# Patient Record
Sex: FEMALE | Race: WHITE | Employment: UNEMPLOYED | URBAN - METROPOLITAN AREA
[De-identification: names, ages, dates, MRNs, and addresses within clinical notes are randomized per-mention and may not be internally consistent; named-entity substitution may affect disease eponyms.]

---

## 2022-05-05 ENCOUNTER — HOSPITAL ENCOUNTER (INPATIENT)
Age: 82
LOS: 3 days | Discharge: HOME | DRG: 947 | End: 2022-05-08
Attending: EMERGENCY MEDICINE | Admitting: FAMILY MEDICINE
Payer: MEDICARE

## 2022-05-05 ENCOUNTER — APPOINTMENT (OUTPATIENT)
Dept: GENERAL RADIOLOGY | Age: 82
DRG: 947 | End: 2022-05-05
Payer: MEDICARE

## 2022-05-05 DIAGNOSIS — R79.89 ELEVATED BRAIN NATRIURETIC PEPTIDE (BNP) LEVEL: ICD-10-CM

## 2022-05-05 DIAGNOSIS — R60.9 PERIPHERAL EDEMA: ICD-10-CM

## 2022-05-05 DIAGNOSIS — R06.09 DOE (DYSPNEA ON EXERTION): Primary | ICD-10-CM

## 2022-05-05 DIAGNOSIS — R09.02 HYPOXIA: ICD-10-CM

## 2022-05-05 DIAGNOSIS — J44.1 CHRONIC OBSTRUCTIVE PULMONARY DISEASE WITH ACUTE EXACERBATION (HCC): ICD-10-CM

## 2022-05-05 LAB
A/G RATIO: 1.6 (ref 1.1–2.2)
ALBUMIN SERPL-MCNC: 3.9 G/DL (ref 3.4–5)
ALP BLD-CCNC: 111 U/L (ref 40–129)
ALT SERPL-CCNC: 23 U/L (ref 10–40)
ANION GAP SERPL CALCULATED.3IONS-SCNC: 10 MMOL/L (ref 3–16)
AST SERPL-CCNC: 29 U/L (ref 15–37)
BACTERIA: NORMAL /HPF
BASOPHILS ABSOLUTE: 0 K/UL (ref 0–0.2)
BASOPHILS RELATIVE PERCENT: 0.8 %
BILIRUB SERPL-MCNC: 0.9 MG/DL (ref 0–1)
BILIRUBIN URINE: NEGATIVE
BLOOD, URINE: ABNORMAL
BUN BLDV-MCNC: 22 MG/DL (ref 7–20)
C DIFF TOXIN/ANTIGEN: NORMAL
CALCIUM SERPL-MCNC: 9.4 MG/DL (ref 8.3–10.6)
CHLORIDE BLD-SCNC: 97 MMOL/L (ref 99–110)
CLARITY: CLEAR
CO2: 25 MMOL/L (ref 21–32)
COLOR: YELLOW
CREAT SERPL-MCNC: 0.7 MG/DL (ref 0.6–1.2)
EOSINOPHILS ABSOLUTE: 0.1 K/UL (ref 0–0.6)
EOSINOPHILS RELATIVE PERCENT: 1.4 %
EPITHELIAL CELLS, UA: 3 /HPF (ref 0–5)
GFR AFRICAN AMERICAN: >60
GFR NON-AFRICAN AMERICAN: >60
GLUCOSE BLD-MCNC: 124 MG/DL (ref 70–99)
GLUCOSE URINE: NEGATIVE MG/DL
HCT VFR BLD CALC: 38.3 % (ref 36–48)
HEMOGLOBIN: 12.8 G/DL (ref 12–16)
HYALINE CASTS: 0 /LPF (ref 0–8)
INFLUENZA A: NOT DETECTED
INFLUENZA B: NOT DETECTED
KETONES, URINE: NEGATIVE MG/DL
LEUKOCYTE ESTERASE, URINE: ABNORMAL
LIPASE: 23 U/L (ref 13–60)
LYMPHOCYTES ABSOLUTE: 1.2 K/UL (ref 1–5.1)
LYMPHOCYTES RELATIVE PERCENT: 23 %
MCH RBC QN AUTO: 32.8 PG (ref 26–34)
MCHC RBC AUTO-ENTMCNC: 33.5 G/DL (ref 31–36)
MCV RBC AUTO: 97.9 FL (ref 80–100)
MICROSCOPIC EXAMINATION: YES
MONOCYTES ABSOLUTE: 0.8 K/UL (ref 0–1.3)
MONOCYTES RELATIVE PERCENT: 15.1 %
NEUTROPHILS ABSOLUTE: 3.1 K/UL (ref 1.7–7.7)
NEUTROPHILS RELATIVE PERCENT: 59.7 %
NITRITE, URINE: NEGATIVE
PDW BLD-RTO: 13.2 % (ref 12.4–15.4)
PH UA: 5 (ref 5–8)
PLATELET # BLD: 135 K/UL (ref 135–450)
PMV BLD AUTO: 9.5 FL (ref 5–10.5)
POTASSIUM SERPL-SCNC: 3.9 MMOL/L (ref 3.5–5.1)
PRO-BNP: 1186 PG/ML (ref 0–449)
PROTEIN UA: NEGATIVE MG/DL
RBC # BLD: 3.91 M/UL (ref 4–5.2)
RBC UA: 1 /HPF (ref 0–4)
SARS-COV-2 RNA, RT PCR: NOT DETECTED
SODIUM BLD-SCNC: 132 MMOL/L (ref 136–145)
SPECIFIC GRAVITY UA: 1.01 (ref 1–1.03)
TOTAL PROTEIN: 6.4 G/DL (ref 6.4–8.2)
TROPONIN: <0.01 NG/ML
URINE REFLEX TO CULTURE: ABNORMAL
URINE TYPE: ABNORMAL
UROBILINOGEN, URINE: 0.2 E.U./DL
WBC # BLD: 5.2 K/UL (ref 4–11)
WBC UA: 2 /HPF (ref 0–5)

## 2022-05-05 PROCEDURE — 71045 X-RAY EXAM CHEST 1 VIEW: CPT

## 2022-05-05 PROCEDURE — 94760 N-INVAS EAR/PLS OXIMETRY 1: CPT

## 2022-05-05 PROCEDURE — 83880 ASSAY OF NATRIURETIC PEPTIDE: CPT

## 2022-05-05 PROCEDURE — 87636 SARSCOV2 & INF A&B AMP PRB: CPT

## 2022-05-05 PROCEDURE — 99285 EMERGENCY DEPT VISIT HI MDM: CPT

## 2022-05-05 PROCEDURE — 2700000000 HC OXYGEN THERAPY PER DAY

## 2022-05-05 PROCEDURE — 93005 ELECTROCARDIOGRAM TRACING: CPT | Performed by: EMERGENCY MEDICINE

## 2022-05-05 PROCEDURE — 2580000003 HC RX 258: Performed by: FAMILY MEDICINE

## 2022-05-05 PROCEDURE — 80053 COMPREHEN METABOLIC PANEL: CPT

## 2022-05-05 PROCEDURE — 85025 COMPLETE CBC W/AUTO DIFF WBC: CPT

## 2022-05-05 PROCEDURE — 83690 ASSAY OF LIPASE: CPT

## 2022-05-05 PROCEDURE — 1200000000 HC SEMI PRIVATE

## 2022-05-05 PROCEDURE — 6360000002 HC RX W HCPCS: Performed by: PHYSICIAN ASSISTANT

## 2022-05-05 PROCEDURE — 87449 NOS EACH ORGANISM AG IA: CPT

## 2022-05-05 PROCEDURE — 36415 COLL VENOUS BLD VENIPUNCTURE: CPT

## 2022-05-05 PROCEDURE — 6360000002 HC RX W HCPCS: Performed by: FAMILY MEDICINE

## 2022-05-05 PROCEDURE — 81001 URINALYSIS AUTO W/SCOPE: CPT

## 2022-05-05 PROCEDURE — 87324 CLOSTRIDIUM AG IA: CPT

## 2022-05-05 PROCEDURE — 84484 ASSAY OF TROPONIN QUANT: CPT

## 2022-05-05 PROCEDURE — 6370000000 HC RX 637 (ALT 250 FOR IP): Performed by: PHYSICIAN ASSISTANT

## 2022-05-05 RX ORDER — ZINC GLUCONATE 50 MG
50 TABLET ORAL DAILY
COMMUNITY

## 2022-05-05 RX ORDER — HYDROCHLOROTHIAZIDE 25 MG/1
25 TABLET ORAL DAILY
Status: ON HOLD | COMMUNITY
End: 2022-05-06 | Stop reason: HOSPADM

## 2022-05-05 RX ORDER — CHLORAL HYDRATE 500 MG
3000 CAPSULE ORAL DAILY
COMMUNITY

## 2022-05-05 RX ORDER — ENOXAPARIN SODIUM 100 MG/ML
40 INJECTION SUBCUTANEOUS NIGHTLY
Status: DISCONTINUED | OUTPATIENT
Start: 2022-05-05 | End: 2022-05-08 | Stop reason: HOSPADM

## 2022-05-05 RX ORDER — ONDANSETRON 2 MG/ML
4 INJECTION INTRAMUSCULAR; INTRAVENOUS EVERY 6 HOURS PRN
Status: DISCONTINUED | OUTPATIENT
Start: 2022-05-05 | End: 2022-05-08 | Stop reason: HOSPADM

## 2022-05-05 RX ORDER — SODIUM CHLORIDE 0.9 % (FLUSH) 0.9 %
5-40 SYRINGE (ML) INJECTION EVERY 12 HOURS SCHEDULED
Status: DISCONTINUED | OUTPATIENT
Start: 2022-05-05 | End: 2022-05-08 | Stop reason: HOSPADM

## 2022-05-05 RX ORDER — UBIDECARENONE 75 MG
200 CAPSULE ORAL
COMMUNITY

## 2022-05-05 RX ORDER — FUROSEMIDE 10 MG/ML
40 INJECTION INTRAMUSCULAR; INTRAVENOUS ONCE
Status: COMPLETED | OUTPATIENT
Start: 2022-05-05 | End: 2022-05-05

## 2022-05-05 RX ORDER — ISOSORBIDE MONONITRATE 30 MG/1
30 TABLET, EXTENDED RELEASE ORAL DAILY
COMMUNITY

## 2022-05-05 RX ORDER — ISOSORBIDE MONONITRATE 30 MG/1
30 TABLET, EXTENDED RELEASE ORAL DAILY
Status: DISCONTINUED | OUTPATIENT
Start: 2022-05-06 | End: 2022-05-08 | Stop reason: HOSPADM

## 2022-05-05 RX ORDER — SODIUM CHLORIDE 0.9 % (FLUSH) 0.9 %
5-40 SYRINGE (ML) INJECTION PRN
Status: DISCONTINUED | OUTPATIENT
Start: 2022-05-05 | End: 2022-05-08 | Stop reason: HOSPADM

## 2022-05-05 RX ORDER — METOPROLOL TARTRATE 50 MG/1
50 TABLET, FILM COATED ORAL DAILY
COMMUNITY

## 2022-05-05 RX ORDER — POLYETHYLENE GLYCOL 3350 17 G/17G
17 POWDER, FOR SOLUTION ORAL DAILY PRN
Status: DISCONTINUED | OUTPATIENT
Start: 2022-05-05 | End: 2022-05-08 | Stop reason: HOSPADM

## 2022-05-05 RX ORDER — ASPIRIN 81 MG/1
81 TABLET ORAL DAILY
COMMUNITY

## 2022-05-05 RX ORDER — ACETAMINOPHEN 650 MG/1
650 SUPPOSITORY RECTAL EVERY 6 HOURS PRN
Status: DISCONTINUED | OUTPATIENT
Start: 2022-05-05 | End: 2022-05-08 | Stop reason: HOSPADM

## 2022-05-05 RX ORDER — SODIUM CHLORIDE 9 MG/ML
INJECTION, SOLUTION INTRAVENOUS PRN
Status: DISCONTINUED | OUTPATIENT
Start: 2022-05-05 | End: 2022-05-08 | Stop reason: HOSPADM

## 2022-05-05 RX ORDER — LISINOPRIL 10 MG/1
10 TABLET ORAL DAILY
COMMUNITY

## 2022-05-05 RX ORDER — ASPIRIN 81 MG/1
243 TABLET, CHEWABLE ORAL ONCE
Status: COMPLETED | OUTPATIENT
Start: 2022-05-05 | End: 2022-05-05

## 2022-05-05 RX ORDER — ACETAMINOPHEN 160 MG
TABLET,DISINTEGRATING ORAL
COMMUNITY

## 2022-05-05 RX ORDER — ACETAMINOPHEN 325 MG/1
650 TABLET ORAL EVERY 6 HOURS PRN
Status: DISCONTINUED | OUTPATIENT
Start: 2022-05-05 | End: 2022-05-08 | Stop reason: HOSPADM

## 2022-05-05 RX ORDER — FUROSEMIDE 10 MG/ML
20 INJECTION INTRAMUSCULAR; INTRAVENOUS DAILY
Status: DISCONTINUED | OUTPATIENT
Start: 2022-05-06 | End: 2022-05-07

## 2022-05-05 RX ORDER — LISINOPRIL 10 MG/1
10 TABLET ORAL DAILY
Status: DISCONTINUED | OUTPATIENT
Start: 2022-05-06 | End: 2022-05-08 | Stop reason: HOSPADM

## 2022-05-05 RX ORDER — ONDANSETRON 4 MG/1
4 TABLET, ORALLY DISINTEGRATING ORAL EVERY 8 HOURS PRN
Status: DISCONTINUED | OUTPATIENT
Start: 2022-05-05 | End: 2022-05-08 | Stop reason: HOSPADM

## 2022-05-05 RX ORDER — ASPIRIN 81 MG/1
81 TABLET ORAL DAILY
Status: DISCONTINUED | OUTPATIENT
Start: 2022-05-06 | End: 2022-05-08 | Stop reason: HOSPADM

## 2022-05-05 RX ADMIN — FUROSEMIDE 40 MG: 10 INJECTION, SOLUTION INTRAMUSCULAR; INTRAVENOUS at 16:10

## 2022-05-05 RX ADMIN — ASPIRIN 81 MG 243 MG: 81 TABLET ORAL at 15:47

## 2022-05-05 RX ADMIN — Medication 10 ML: at 20:59

## 2022-05-05 RX ADMIN — ENOXAPARIN SODIUM 40 MG: 100 INJECTION SUBCUTANEOUS at 20:59

## 2022-05-05 ASSESSMENT — ENCOUNTER SYMPTOMS
NAUSEA: 1
BACK PAIN: 0
COLOR CHANGE: 0
VOMITING: 0
STRIDOR: 0
SHORTNESS OF BREATH: 1
ABDOMINAL PAIN: 0
DIARRHEA: 1
WHEEZING: 0
COUGH: 1
CONSTIPATION: 0

## 2022-05-05 ASSESSMENT — PAIN - FUNCTIONAL ASSESSMENT: PAIN_FUNCTIONAL_ASSESSMENT: NONE - DENIES PAIN

## 2022-05-05 NOTE — ED PROVIDER NOTES
EKG is reviewed by myself. Dated today 46. Rate 72 sinus rhythm normal EKG.      Naga Vasquez MD  05/05/22 0007

## 2022-05-05 NOTE — ED PROVIDER NOTES
[facility]        Pt Name: Fouzia Truong  MRN: [id number]  [place] 1940  Date of evaluation: 5/5/2022  Provider: Joby Murray PA-C  PCP: Rosie Martinez  Note Started: 1:46 PM EDT        I have seen and evaluated this patient with my supervising physician Letty Marr MD.    [facility]       Chief Complaint   Patient presents with    Cough     non productive cough with sob during ambulation since sunday, negative covid test       HISTORY OF PRESENT ILLNESS   (Location, Timing/Onset, Context/Setting, Quality, Duration, Modifying Factors, Severity, Associated Signs and Symptoms)  Note limiting factors. Chief Complaint: Cough, shortness of breath, fatigue, diarrhea    Fouzia Truong is a 80 y.o. female who presents to the emergency department with daughter at bedside. Patient reports a nonproductive cough since Sunday. She has progressively become more short of breath, primarily with mild exertion. Denies chest pain, hemoptysis, palpitations, abdominal pain, vomiting, bloody or black stool. she developed nausea and diarrhea today. She describes it as watery stool. Denies documented fever or chills. Daughter is concerned because patient has history of coronary artery disease and stents. Rapid covid test today at home was negative. She does report some mild edema in lower legs which is new. Nursing Notes were all reviewed and agreed with or any disagreements were addressed in the HPI. REVIEW OF SYSTEMS    (2-9 systems for level 4, 10 or more for level 5)     Review of Systems   Constitutional: Positive for fatigue. Negative for chills and fever. HENT: Negative. Eyes: Negative for visual disturbance. Respiratory: Positive for cough and shortness of breath. Negative for wheezing and stridor. Cardiovascular: Positive for leg swelling. Negative for chest pain and palpitations.    Gastrointestinal: Positive for diarrhea and nausea. Negative for abdominal pain, constipation and vomiting. Genitourinary: Negative. Musculoskeletal: Negative for back pain, neck pain and neck stiffness. Skin: Negative for color change, pallor, rash and wound. Neurological: Negative for dizziness, tremors, seizures, syncope, facial asymmetry, speech difficulty, weakness, light-headedness, numbness and headaches. Psychiatric/Behavioral: Negative for confusion. All other systems reviewed and are negative. Positives and Pertinent negatives as per HPI. Except as noted above in the ROS, all other systems were reviewed and negative. PAST MEDICAL HISTORY     Past Medical History:   Diagnosis Date    CAD (coronary artery disease)     Hyperlipidemia     Hypertension          SURGICAL HISTORY     Past Surgical History:   Procedure Laterality Date    CARDIAC SURGERY      CHOLECYSTECTOMY           CURRENTMEDICATIONS       Previous Medications    ASPIRIN 81 MG EC TABLET    Take 81 mg by mouth daily    CHOLECALCIFEROL (VITAMIN D3) 50 MCG (2000 UT) CAPS    Take by mouth    COENZYME Q10 (CO Q-10) 200 MG CAPS    Take 200 mg by mouth    CRANBERRY EXTRACT PO    Take by mouth    HYDROCHLOROTHIAZIDE (HYDRODIURIL) 25 MG TABLET    Take 25 mg by mouth daily    ISOSORBIDE MONONITRATE (IMDUR) 30 MG EXTENDED RELEASE TABLET    Take 30 mg by mouth daily    LISINOPRIL (PRINIVIL;ZESTRIL) 10 MG TABLET    Take 10 mg by mouth daily    METOPROLOL TARTRATE (LOPRESSOR) 50 MG TABLET    Take 50 mg by mouth daily 1 1/2 tabs    OMEGA-3 FATTY ACIDS (FISH OIL) 1000 MG CAPS    Take 3,000 mg by mouth daily    ZINC 50 MG TABS TABLET    Take 50 mg by mouth daily         ALLERGIES     Patient has no allergy information on record. FAMILYHISTORY     No family history on file.        SOCIAL HISTORY       Social History     Tobacco Use    Smoking status: Former Smoker    Smokeless tobacco: Never Used   Vaping Use    Vaping Use: Never used   Substance Use Topics    Alcohol use: Never    Drug use: Never       SCREENINGS    [place] Coma Scale  Eye Opening: Spontaneous  Best Verbal Response: Oriented  Best Motor Response: Obeys commands  [place]rist[place]bal Coma Scale Score: 15        PHYSICAL EXAM    (up to 7 for level 4, 8 or more for level 5)     ED Triage Vitals [05/05/22 1319]   BP Temp Temp Source Pulse Resp SpO2 Height Weight   (!) 151/68 97.7 °F (36.5 °C) Oral 76 20 94 % 4' 9\" (1.448 m) 155 lb (70.3 kg)       Physical Exam  Vitals and nursing note reviewed. Constitutional:       Appearance: Normal appearance. She is well-developed. She is not toxic-appearing or diaphoretic. HENT:      Head: Normocephalic and atraumatic. Jaw: There is normal jaw occlusion. Right Ear: External ear normal.      Left Ear: External ear normal.      Nose: Nose normal.      Mouth/Throat:      Lips: Pink. Mouth: Mucous membranes are moist. No oral lesions or angioedema. Dentition: No dental tenderness or dental abscesses. Pharynx: Oropharynx is clear. Uvula midline. No uvula swelling. Eyes:      General: No scleral icterus. Right eye: No discharge. Left eye: No discharge. Extraocular Movements: Extraocular movements intact. Conjunctiva/sclera: Conjunctivae normal.      Pupils: Pupils are equal, round, and reactive to light. Cardiovascular:      Rate and Rhythm: Normal rate. Pulmonary:      Effort: Pulmonary effort is normal.      Breath sounds: No stridor. No rhonchi. Abdominal:      General: Bowel sounds are normal. There is no distension. Palpations: Abdomen is soft. Tenderness: There is no abdominal tenderness. There is no right CVA tenderness or left CVA tenderness. Musculoskeletal:         General: Normal range of motion. Cervical back: Normal range of motion. Comments: No posterior calf or thigh tenderness, palpable cord, discoloration. Negative homans. Trace symmetrical pretibial edema.    Skin: General: Skin is warm and dry. Coloration: Skin is not jaundiced or pale. Findings: No bruising, erythema, lesion or rash. Neurological:      General: No focal deficit present. Mental Status: She is alert and oriented to person, place, and time. Psychiatric:         Behavior: Behavior normal.         DIAGNOSTIC RESULTS   LABS:    Labs Reviewed   CBC WITH AUTO DIFFERENTIAL - Abnormal; Notable for the following components:       Result Value    RBC 3.91 (*)     All other components within normal limits   COMPREHENSIVE METABOLIC PANEL - Abnormal; Notable for the following components:    Sodium 132 (*)     Chloride 97 (*)     Glucose 124 (*)     BUN 22 (*)     All other components within normal limits   BRAIN NATRIURETIC PEPTIDE - Abnormal; Notable for the following components:    Pro-BNP 1,186 (*)     All other components within normal limits   URINALYSIS WITH REFLEX TO CULTURE - Abnormal; Notable for the following components:    Blood, Urine TRACE (*)     Leukocyte Esterase, Urine TRACE (*)     All other components within normal limits   COVID-19 & INFLUENZA COMBO   GASTROINTESTINAL PANEL, MOLECULAR   C DIFF TOXIN/ANTIGEN   TROPONIN   LIPASE   MICROSCOPIC URINALYSIS       When ordered only abnormal lab results are displayed. All other labs were within normal range or not returned as of this dictation. EKG: When ordered, EKG's are interpreted by the Emergency Department Physician in the absence of a cardiologist.  Please see their note for interpretation of EKG. RADIOLOGY:   Non-plain film images such as CT, Ultrasound and MRI are read by the radiologist. Plain radiographic images are visualized and preliminarily interpreted by the ED Provider with the below findings:        Interpretation per the Radiologist below, if available at the time of this note:    XR CHEST PORTABLE   Final Result   No acute process.                PROCEDURES   Unless otherwise noted below, none Procedures    CRITICAL CARE TIME   Critical Care  There was a high probability of life-threatening clinical deterioration in the patient's condition requiring my urgent intervention. Total critical care time with the patient was 31 minutes excluding separately reportable procedures. Critical care required due to patients hypoxia with ambulation and clinical concern for developing chf prompting medical management, consultation and admission. CONSULTS:  IP CONSULT TO HOSPITALIST      EMERGENCY DEPARTMENT COURSE and DIFFERENTIAL DIAGNOSIS/MDM:   Vitals:    Vitals:    05/05/22 1319 05/05/22 1434   BP: (!) 151/68 (!) 144/55   Pulse: 76 69   Resp: 20 24   Temp: 97.7 °F (36.5 °C)    TempSrc: Oral    SpO2: 94% 94%   Weight: 155 lb (70.3 kg)    Height: 4' 9\" (1.448 m)        Patient was given the following medications:  Medications   furosemide (LASIX) injection 40 mg (has no administration in time range)   aspirin chewable tablet 243 mg (has no administration in time range)         This patient presents to the emergency department with complaints of shortness of breath and nonproductive cough. She also reports some peripheral edema. Abdomen soft and nontender but does associate this with nausea and diarrhea starting today. She has no emesis throughout stay here. Has not been able to give a stool sample yet. Chest x-ray appears stable however BNP is elevated. COVID and flu swabs are negative. My attending ambulated this patient on room air and oxygen saturation dropped to 87%. Therefore, we do feel admission is warranted. We are concerned for developing CHF. Patient and family understand and agree with plan. FINAL IMPRESSION      1. MENENDEZ (dyspnea on exertion)    2. Peripheral edema    3. Hypoxia    4. Elevated brain natriuretic peptide (BNP) level          DISPOSITION/PLAN   DISPOSITION        PATIENT REFERRED TO:  No follow-up provider specified.     DISCHARGE MEDICATIONS:  New Prescriptions    No medications on file       DISCONTINUED MEDICATIONS:  Discontinued Medications    No medications on file              (Please note that portions of this note were completed with a voice recognition program.  Efforts were made to edit the dictations but occasionally words are mis-transcribed.)    Neliad Coleman PA-C (electronically signed)           Nelida Coleman PA-C  05/05/22 1529

## 2022-05-05 NOTE — PROGRESS NOTES
Pt admitted to [address] [place] [address] from ED. A&Ox4. VSS. Denies pain. SOB with exertion. On 2 L NC, not home dependent. Rule out c diff isolation, sample collected in ED. Oriented to room and procedures. Educated on use of call light for needs, verbalized understanding. Bedside table and call light within reach. Bed alarm on. Will continue to monitor.     Electronically signed by Jay Nelson RN on 5/5/2022 at 6:40 PM

## 2022-05-05 NOTE — ED PROVIDER NOTES
I independently performed a history and physical on Alex Dewitt. I personally saw the patient and performed a substantive portion of the visit including all aspects of the medical decision making. Briefly, this is a 80 y.o. female here for shortness of breath and cough. Started on Sunday. States today started having diarrhea. Some nausea without vomiting. Cough is productive. Had sick contacts from her grandchildren. Not having body aches. He is having difficulty ambulating. Lives with her  at home. Is visiting her daughter. No history of CHF. Has occasional chest pain on and off but this is chronic and unchanged. .    On exam,   General: Patient is in no acute distress  Skin: No cyanosis  HEENT: Moist mucous membranes  Heart: Regular rate, regular rhythm  Lung: No respiratory distress, mild rhonchi bilaterally. Coughing  Abdomen: Soft, nontender  Neuro: Moving all extremities, no facial droop, no slurred speech, answers questions appropriately        EKG  The Ekg interpreted by me in the absence of a cardiologist shows. Normal sinus rhythm  No acute ST changes   HR 72  Nonspecific T wave abnormality  No priors to compare      Screenings   [place] Coma Scale  Eye Opening: Spontaneous  Best Verbal Response: Oriented  Best Motor Response: Obeys commands  Cristóbal Coma Scale Score: 15        MDM  Briefly, this is a 80 y.o. female here for cough, shortness of breath. Has unchanged baseline occasional angina. Likely infectious in etiology. Chest x-ray shows no obvious pneumonia. No signs of sepsis. I did ambulate the patient and she became significantly dyspneic. She desaturated to 87% and she typically does not use oxygen. Will admit to hospitalist service. Likely has some aspect of failure given BNP is elevated. We will give some Lasix and aspirin. Doubt ACS. Initial troponin negative. EKG shows no acute ischemic changes.   Given predominant infectious symptoms, I doubt that this is pulmonary embolism. .         Patient Referrals:  No follow-up provider specified. Discharge Medications:  New Prescriptions    No medications on file       FINAL IMPRESSION  1. MENENDEZ (dyspnea on exertion)    2. Peripheral edema    3. Hypoxia        Blood pressure (!) 144/55, pulse 69, temperature 97.7 °F (36.5 °C), temperature source Oral, resp. rate 24, height 4' 9\" (1.448 m), weight 155 lb (70.3 kg), SpO2 94 %. For further details of Angela Denise emergency department encounter, please see documentation by advanced practice providerCandy.         Erick Damon MD  05/05/22 6754

## 2022-05-06 LAB
ANION GAP SERPL CALCULATED.3IONS-SCNC: 12 MMOL/L (ref 3–16)
BUN BLDV-MCNC: 24 MG/DL (ref 7–20)
CALCIUM SERPL-MCNC: 9.4 MG/DL (ref 8.3–10.6)
CHLORIDE BLD-SCNC: 99 MMOL/L (ref 99–110)
CO2: 26 MMOL/L (ref 21–32)
CREAT SERPL-MCNC: 0.7 MG/DL (ref 0.6–1.2)
EKG ATRIAL RATE: 72 BPM
EKG DIAGNOSIS: NORMAL
EKG P AXIS: 37 DEGREES
EKG P-R INTERVAL: 170 MS
EKG Q-T INTERVAL: 388 MS
EKG QRS DURATION: 92 MS
EKG QTC CALCULATION (BAZETT): 424 MS
EKG R AXIS: 20 DEGREES
EKG T AXIS: 41 DEGREES
EKG VENTRICULAR RATE: 72 BPM
GFR AFRICAN AMERICAN: >60
GFR NON-AFRICAN AMERICAN: >60
GLUCOSE BLD-MCNC: 117 MG/DL (ref 70–99)
HCT VFR BLD CALC: 38 % (ref 36–48)
HEMOGLOBIN: 12.7 G/DL (ref 12–16)
LV EF: 68 %
LVEF MODALITY: NORMAL
MCH RBC QN AUTO: 32.9 PG (ref 26–34)
MCHC RBC AUTO-ENTMCNC: 33.5 G/DL (ref 31–36)
MCV RBC AUTO: 98.4 FL (ref 80–100)
PDW BLD-RTO: 13.1 % (ref 12.4–15.4)
PLATELET # BLD: 133 K/UL (ref 135–450)
PMV BLD AUTO: 9.7 FL (ref 5–10.5)
POTASSIUM SERPL-SCNC: 3.9 MMOL/L (ref 3.5–5.1)
RBC # BLD: 3.87 M/UL (ref 4–5.2)
SODIUM BLD-SCNC: 137 MMOL/L (ref 136–145)
WBC # BLD: 4.9 K/UL (ref 4–11)

## 2022-05-06 PROCEDURE — 2580000003 HC RX 258: Performed by: FAMILY MEDICINE

## 2022-05-06 PROCEDURE — 97165 OT EVAL LOW COMPLEX 30 MIN: CPT

## 2022-05-06 PROCEDURE — 97161 PT EVAL LOW COMPLEX 20 MIN: CPT

## 2022-05-06 PROCEDURE — 36415 COLL VENOUS BLD VENIPUNCTURE: CPT

## 2022-05-06 PROCEDURE — 6370000000 HC RX 637 (ALT 250 FOR IP): Performed by: FAMILY MEDICINE

## 2022-05-06 PROCEDURE — 97530 THERAPEUTIC ACTIVITIES: CPT

## 2022-05-06 PROCEDURE — 93010 ELECTROCARDIOGRAM REPORT: CPT | Performed by: INTERNAL MEDICINE

## 2022-05-06 PROCEDURE — 80048 BASIC METABOLIC PNL TOTAL CA: CPT

## 2022-05-06 PROCEDURE — 1200000000 HC SEMI PRIVATE

## 2022-05-06 PROCEDURE — 6360000002 HC RX W HCPCS: Performed by: FAMILY MEDICINE

## 2022-05-06 PROCEDURE — 94680 O2 UPTK RST&XERS DIR SIMPLE: CPT

## 2022-05-06 PROCEDURE — 97535 SELF CARE MNGMENT TRAINING: CPT

## 2022-05-06 PROCEDURE — 93306 TTE W/DOPPLER COMPLETE: CPT

## 2022-05-06 PROCEDURE — 85027 COMPLETE CBC AUTOMATED: CPT

## 2022-05-06 RX ORDER — FUROSEMIDE 20 MG/1
20 TABLET ORAL DAILY
Qty: 30 TABLET | Refills: 0 | Status: SHIPPED
Start: 2022-05-06 | End: 2022-05-08 | Stop reason: HOSPADM

## 2022-05-06 RX ORDER — ALBUTEROL SULFATE 90 UG/1
2 AEROSOL, METERED RESPIRATORY (INHALATION) EVERY 6 HOURS PRN
Status: DISCONTINUED | OUTPATIENT
Start: 2022-05-06 | End: 2022-05-08 | Stop reason: HOSPADM

## 2022-05-06 RX ADMIN — METOPROLOL SUCCINATE 75 MG: 50 TABLET, EXTENDED RELEASE ORAL at 08:26

## 2022-05-06 RX ADMIN — Medication 10 ML: at 08:26

## 2022-05-06 RX ADMIN — ASPIRIN 81 MG: 81 TABLET, COATED ORAL at 08:26

## 2022-05-06 RX ADMIN — Medication 10 ML: at 20:01

## 2022-05-06 RX ADMIN — LISINOPRIL 10 MG: 10 TABLET ORAL at 08:26

## 2022-05-06 RX ADMIN — FUROSEMIDE 20 MG: 10 INJECTION, SOLUTION INTRAMUSCULAR; INTRAVENOUS at 08:26

## 2022-05-06 RX ADMIN — ISOSORBIDE MONONITRATE 30 MG: 30 TABLET, EXTENDED RELEASE ORAL at 08:26

## 2022-05-06 RX ADMIN — ENOXAPARIN SODIUM 40 MG: 100 INJECTION SUBCUTANEOUS at 20:00

## 2022-05-06 ASSESSMENT — PAIN SCALES - GENERAL
PAINLEVEL_OUTOF10: 0

## 2022-05-06 NOTE — PROGRESS NOTES
05/06/22 1734   Resting (Room Air)   SpO2 87   HR 72   Resting (On O2)   SpO2 92   HR 66   O2 Device Nasal cannula   O2 Flow Rate (l/min) 1 l/min   During Walk (Room Air)   Walk/Assistance Device Walker   During Walk (On O2)   SpO2 90   HR 75   O2 Device Nasal cannula   O2 Flow Rate (l/min) 2 l/min   Need Additional O2 Flow Rate Rows Yes   O2 Flow Rate (l/min) 3 l/min   O2 Saturation 92   Rate of Dyspnea 0   After Walk   Does the Patient Qualify for Home O2 Yes Clear bilaterally, pupils equal, round and reactive to light.

## 2022-05-06 NOTE — FLOWSHEET NOTE
05/06/22 1507   Encounter Summary   Encounter Overview/Reason  Initial Encounter; Advance Care Planning   Service Provided For: Patient and family together   Referral/Consult From: Nurse   Support System Spouse; Children;Family members   Last Encounter  05/06/22  (AD discussion, no needs expressed. See ACP note. GB 5/6)   Complexity of Encounter Low   Begin Time 1445   End Time  1510   Total Time Calculated 25 min   Encounter    Type Initial Screen/Assessment   Advance Care Planning   Type ACP conversation     See ACP note below. Electronically signed by Arnoldo Walter on 5/6/2022 at 3:08 PM    Advance Care Planning     Advance Care Planning Inpatient Note  Spiritual Care Department    Today's Date: 5/6/2022  [facility]    Received request from IDT Member. Upon review of chart and communication with care team, patient's decision making abilities are not in question. . Patient and family was/were present in the room during visit. Goals of ACP Conversation:  Discuss advance care planning documents    Assessment:   self-initiated this visit per spiritual care consult. Pt and family were present at this time and engaged easily w/ at this time. Pt and family indicated that they have no AD needs at this time. Pt identified that she has previously completed AD's and has a copy they would be able to have a family member to bring to the hospital.  encouraged family to bring a copy to the hospital at this time. No other immediate AD or spiritual care needs were expressed at this time. Should any further needs arise, please contact spiritual care services.      Interventions:  Requested patient/family to submit existing document for our records: Healthcare Power of /Advance Directive Appointment of [address]  Encouraged ongoing ACP conversation with future decision makers and loved ones    Care Preferences Communicated:   No    Outcomes/Plan:  ACP Discussion: Completed    Electronically signed by Unknown Kehr on 5/6/2022 at 3:08 PM

## 2022-05-06 NOTE — PROGRESS NOTES
[facility] PROGRESS NOTE    5/6/2022 2:38 PM        Name: Jaron Thrasher . Admitted: 5/5/2022  Primary Care Provider: Raven Jessica (Tel: None)                        Subjective:  . No acute events overnight. Resting well. Pain control. Diet ok. Labs reviewed  Denies any chest pain sob. Reviewed interval ancillary notes    Current Medications  aspirin EC tablet 81 mg, Daily  isosorbide mononitrate (IMDUR) extended release tablet 30 mg, Daily  lisinopril (PRINIVIL;ZESTRIL) tablet 10 mg, Daily  furosemide (LASIX) injection 20 mg, Daily  sodium chloride flush 0.9 % injection 5-40 mL, 2 times per day  sodium chloride flush 0.9 % injection 5-40 mL, PRN  0.9 % sodium chloride infusion, PRN  enoxaparin (LOVENOX) injection 40 mg, Nightly  ondansetron (ZOFRAN-ODT) disintegrating tablet 4 mg, Q8H PRN   Or  ondansetron (ZOFRAN) injection 4 mg, Q6H PRN  polyethylene glycol (GLYCOLAX) packet 17 g, Daily PRN  acetaminophen (TYLENOL) tablet 650 mg, Q6H PRN   Or  acetaminophen (TYLENOL) suppository 650 mg, Q6H PRN  metoprolol succinate (TOPROL XL) extended release tablet 75 mg, Daily        Objective:  BP (!) 94/54   Pulse 66   Temp 97.5 °F (36.4 °C) (Oral)   Resp 19   Ht 4' 9\" (1.448 m)   Wt 155 lb (70.3 kg)   SpO2 93%   BMI 33.54 kg/m²     Intake/Output Summary (Last 24 hours) at 5/6/2022 1438  Last data filed at 5/6/2022 1055  Gross per 24 hour   Intake 350 ml   Output 200 ml   Net 150 ml      Wt Readings from Last 3 Encounters:   05/05/22 155 lb (70.3 kg)       General appearance:  Appears comfortable  Eyes: Sclera clear. Pupils equal.  ENT: Moist oral mucosa. Trachea midline, no adenopathy. Cardiovascular: Regular rhythm, normal S1, S2. No murmur. No edema in lower extremities  Respiratory: Not using accessory muscles. Good inspiratory effort.  Clear to auscultation bilaterally, no wheeze or crackles. GI: Abdomen soft, no tenderness, not distended, normal bowel sounds  Musculoskeletal: No cyanosis in digits, neck supple  Neurology: CN 2-12 grossly intact. No speech or motor deficits  Psych: Normal affect. Alert and oriented in time, place and person  Skin: Warm, dry, normal turgor    Labs and Tests:  CBC:   Recent Labs     05/05/22  1350 05/06/22  0528   WBC 5.2 4.9   HGB 12.8 12.7    133*     BMP:    Recent Labs     05/05/22  1350 05/06/22  0528   * 137   K 3.9 3.9   CL 97* 99   CO2 25 26   BUN 22* 24*   CREATININE 0.7 0.7   GLUCOSE 124* 117*     Hepatic:   Recent Labs     05/05/22  1350   AST 29   ALT 23   BILITOT 0.9   ALKPHOS 111       Discussed care with patient             Problem List  Principal Problem:    Peripheral edema  Resolved Problems:    * No resolved hospital problems. *       Assessment & Plan:   1. Peripheral edema  -Seems to be improving.  -Echocardiogram does not show any signs of any systolic dysfunction  -Could be gravity dependent. We will continue IV Lasix for 1 more day    Dyspnea and ambulatory hypoxia.  -On room air right now. Monitor    PT OT evaluation if continues to improve plan for discharge tomorrow      Diet: ADULT DIET;  Regular  Code:Full Code  DVT PPX lovenox       Kika Be MD   5/6/2022 2:38 PM

## 2022-05-06 NOTE — PROGRESS NOTES
[facility]   Phone: [phone]    Occupational Therapy    [x] Initial Evaluation            [] Daily Treatment Note         [] Discharge Summary      Patient: Blu Sandy   : 1940   MRN: [id number]   Date of Service:  2022    Admitting Diagnosis:  Peripheral edema  Current Admission Summary: Karissa West a 80 y.o. female who presents to the emergency department with daughter at bedside. Nellie Landau reports a nonproductive cough since .  She has progressively become more short of breath, primarily with mild exertion.  Denies chest pain, hemoptysis, palpitations, abdominal pain, vomiting, bloody or black stool. Pita Covina developed nausea and diarrhea today. Hermelinda Vogel describes it as watery stool.  Denies documented fever or chills.  Daughter is concerned because patient has history of coronary artery disease and stents. Rapid covid test today at home was negative. She does report some mild edema in lower legs which is new. Past Medical History:  has a past medical history of CAD (coronary artery disease), Hyperlipidemia, and Hypertension. Past Surgical History:  has a past surgical history that includes Cardiac surgery and Cholecystectomy. Discharge Recommendations: Blu Sandy scored a 20/24 on the AM-PAC ADL Inpatient form. At this time, no further OT is recommended upon discharge due to pt is expected to be at her baseline level of occupational function by discharge and will have 24/7 assistance from her daughter. Recommend patient returns to prior setting with assistance as needed.       DME Required For Discharge: no DME required at discharge    Precautions/Restrictions: high fall risk  Positional Restrictions:no positional restrictions    Pre-Admission Information   Lives With: daughter Pt will have 24/7 assist at discharge while staying at her daughter's house         Type of Home: house  Home Layout: two level, able to live on main level  Home Access: 1+1 step to enter without rails   Bathroom Layout: walker accessible, walk in shower - shower is upstairs, built in shower chair  Toilet Height: standard height  Bathroom Equipment: . Comment: none  Home Equipment: . Comment: no DME at daughter's home  Transfer Assistance: Independent without use of device  Ambulation Assistance:Independent without use of device  ADL Assistance: independent with all ADL's  IADL Assistance: pt does some meal prep;  does most all chores  Active :        [x]? Yes                 []? No -has not driven for ~8 years  Hand Dominance: []? Left                 [x]? Right  Current Employment: Retired  Hobbies: iPad, cards with friends, Mendel Jew on tv  Recent Falls: no falls    Examination   Vision:   Vision Gross Assessment: WFL  Hearing:   WFL  Sensation:   WFL    Coordination Testing:   WFL    ROM:   (B) UE ROM WFL  Strength:   (L) Shoulder: +4     (R) Shoulder: +4  (L) Elbow: +4     (R) Elbow: +4  (L) Hand: +4      (R) Hand: +4    Decision Making: low complexity  Clinical Presentation: stable      Subjective  General: Pt in the bathroom, seated on the toilet with daughter present on therapy arrival. Pt very pleasant and agreeable to OT eval.   Pain: 0/10  Pain Interventions: not applicable        Activities of Daily Living  Basic Activities of Daily Living  Grooming: stand by assistance  Grooming Comments: in stance at sink to wash hands  Lower Extremity Dressing: stand by assistance. Equipment: none  Dressing Comments: SBA clothing mgt, I don/doff socks  Toileting: stand by assistance. Equipment: none  Instrumental Activities of Daily Living  No IADL completed on this date. Functional Mobility  Bed Mobility  Bed mobility not completed on this date. Comments: Pt on toilet on arrival and in recliner at end of session.   Transfers  Sit to stand transfer:stand by assistance  Stand to sit transfer: stand by assistance  Stand step transfer: contact guard assistance  Comments: from toilet, to recliner X 2 trials, from recliner  Functional Mobility:  Sitting Balance: Independent. Duration: ~3 min. Activity: toileting. Standing Balance: contact guard assistance. Duration: ~1 min X 3. Activity: clothing mgt & washing hands at sink, functional mobility to recliner; functional mobility ~50 ft in room without AD, ~30 ft in room w/RW. Functional Mobility: rolling walker. contact guard assistance. Activity: to/from bathroom; functional mobility in room  Comment: CGA without AD, SBA with RW. Other Therapeutic Interventions    Functional Outcomes  AM-PAC Inpatient Daily Activity Raw Score: 20    Cognition  WFL  Orientation:    alert and oriented x 4  Command Following:   [facility]     Education  Barriers To Learning: none  Patient Education: patient educated on goals, OT role and benefits, plan of care, family education, discharge recommendations  Learning Assessment:  patient verbalizes and demonstrates understanding    Assessment  Activity Tolerance: seated: 95% SPO2 on 1L, HR 73, BP 94/54, MAP 68; SpO2=94-95% during amb in room on 1L; RN aware  Impairments Requiring Therapeutic Intervention: decreased functional mobility, decreased ADL status, decreased endurance  Prognosis: good     Clinical Assessment: Pt is below her baseline level of occupational function, based on the above deficits associated with peripheral edema. Pt would benefit from continued skilled acute OT services to address these deficits and facilitate a return to her PLOF. Pt will have 24/7 supervision from family at discharge. Safety Interventions: patient left in chair, gait belt, nurse notified and family/caregiver present RN aware alarm not set    Plan  Frequency: 3-5 x/per week  Current Treatment Recommendations: functional mobility training, transfer training, endurance training, ADL/self-care training and safety education    Goals  Patient Goals:  To return home with daughter   Short Term Goals: Time Frame: Discharge  Patient will complete upper body ADL at Independent   Patient will complete lower body ADL at modified independent   Patient will complete toileting at modified independent   Patient will complete grooming at Independent   Patient will complete functional transfers at modified independent   Patient will complete functional mobility at modified independent   Patient to maintain standing at modified independent for 5-7 min minutes.  for ADL activity  With RW for transfers & functional mobility    Therapy Session Time     Individual Group Co-treatment   Time In    1353   Time Out    1433   Minutes    40        Timed Code Treatment Minutes:   25 min  Total Treatment Minutes:  40 min       Ellen Mccarty, OTR/L, [id number]

## 2022-05-06 NOTE — CARE COORDINATION
Chart reviewed for d/c planning. Pt from home. Will have 24 hr assistance from daughter on d/c per PT notes  And PT has no further recommendations for therapy on discharge. Pt scored with PT 19/24. Pt on one liter of oxygen at this time. CM will monitor for d/c needs.       Dulce Zuluaga RN, BSN  [phone]

## 2022-05-06 NOTE — PLAN OF CARE
Problem: Safety - Adult  Goal: Free from fall injury  5/6/2022 0824 by Bryan Hicks RN  Outcome: Progressing  Flowsheets (Taken 5/6/2022 0823)  Free From Fall Injury: Jerri [facility] family/caregiver on patient safety  5/6/2022 0301 by Nguyen Heredia RN  Outcome: Progressing     Problem: Pain  Goal: Verbalizes/displays adequate comfort level or baseline comfort level  5/6/2022 0824 by Bryan Hicks RN  Outcome: Progressing  Flowsheets (Taken 5/6/2022 7906)  Verbalizes/displays adequate comfort level or baseline comfort level:   Encourage patient to monitor pain and request assistance   Assess pain using appropriate pain scale  5/6/2022 0301 by Nguyen Heredia RN  Outcome: Shantal KAUFMAN, RN   [phone]

## 2022-05-06 NOTE — DISCHARGE SUMMARY
Maya Mccray RCP   Respiratory Therapist   Specialty:  Respiratory Therapy   Progress Notes       Signed   Date of Service:  5/6/2022  5:50 PM                 Signed              Show:Clear all  []Manual[x]Template[]Copied    Added by:  [x]Rancho Oh RCP      []Maribel for details         05/06/22 4586   Resting (Room Air)   SpO2 87   HR 72   Resting (On O2)   SpO2 92   HR 66   O2 Device Nasal cannula   O2 Flow Rate (l/min) 1 l/min   During Walk (Room Air)   Walk/Assistance Device Walker   During Walk (On O2)   SpO2 90   HR 75   O2 Device Nasal cannula   O2 Flow Rate (l/min) 2 l/min   Need Additional O2 Flow Rate Rows Yes   O2 Flow Rate (l/min) 3 l/min   O2 Saturation 92   Rate of Dyspnea 0   After Walk   Does the Patient Qualify for Home O2 Yes

## 2022-05-06 NOTE — PROGRESS NOTES
[facility]   Phone: [phone]    Physical Therapy    [x] Initial Evaluation            [] Daily Treatment Note         [] Discharge Summary      Patient: Walter Edwards   : 1940   MRN: [id number]   Date of Service:  2022  Admitting Diagnosis: Peripheral edema  Current Admission Summary: Walter Edwards is a 80 y.o. female who presents to the emergency department with daughter at bedside. Patient reports a nonproductive cough since . She has progressively become more short of breath, primarily with mild exertion. Denies chest pain, hemoptysis, palpitations, abdominal pain, vomiting, bloody or black stool. she developed nausea and diarrhea today. She describes it as watery stool. Denies documented fever or chills. Daughter is concerned because patient has history of coronary artery disease and stents. Rapid covid test today at home was negative. She does report some mild edema in lower legs which is new.     Past Medical History:  has a past medical history of CAD (coronary artery disease), Hyperlipidemia, and Hypertension. Past Surgical History:  has a past surgical history that includes Cardiac surgery and Cholecystectomy. Discharge Recommendations: Walter Edwards scored a 19/24 on the AM-PAC short mobility form. At this time, no further PT is recommended upon discharge due to pt being close to baseline and having 24/7 assist from her daughter. Recommend patient returns to prior setting with prior services.         DME Required For Discharge: rolling walker  Precautions/Restrictions: high fall risk  Positional Restrictions:no positional restrictions    Pre-Admission Information   Lives With: daughter Pt will have 24/7 assist at discharge while staying at her daughter's house   Type of Home: house  Home Layout: two level, able to live on main level  Home Access: 1+1 step to enter without rails   Bathroom Layout: walker accessible, walk in shower - shower is upstairs, built in shower chair  Toilet Height: standard height  Bathroom Equipment: . Comment: none  Home Equipment: . Comment: no DME at daughter's home  Transfer Assistance: Independent without use of device  Ambulation Assistance:Independent without use of device  ADL Assistance: independent with all ADL's  IADL Assistance: pt does some meal prep;  does most all chores  Active :        [x] Yes  [] No -has not driven for ~8 years  Hand Dominance: [] Left  [x] Right  Current Employment: Retired  Hobbies: iPad, cards with friends, Adrianne MartinesGuestShots on tv  Recent Falls: no falls    Examination   Vision:   Vision Gross Assessment: WFL  Hearing:   WFL  Sensation:   WFL  ROM:   (B) LE ROM WFL  Strength:   (B) LE strength grossly 4  Decision Making: low complexity  Clinical Presentation: stable      Subjective  General: Pt in bathroom upon arrival; daughter with pt  Pain: 0/10  Pain Interventions: not applicable       Functional Mobility  Bed Mobility  Bed mobility not completed on this date. Comments:  Transfers  Sit to stand transfer: stand by assistance  Stand to sit transfer: stand by assistance  Comments:  Ambulation  Assistive Device: no device  Assistance: contact guard assistance  Distance: 50 ft    Gait Mechanics: Pt amb with decreased dony, slight lateral leaning, decreased step lengths, slightly unsteady   Comments:  Pt rested in chair briefly, then amb a second time: amb 30 ft with RW with SBA with improved dony and step lengths  Stair Mobility  Stair mobility not completed on this date.   Comments:  Balance  Static Sitting Balance: good: independent with functional balance in unsupported position  Dynamic Sitting Balance: fair (+): maintains balance at SBA/supervision without use of UE support  Static Standing Balance: fair (+): maintains balance at SBA/supervision without use of UE support  Dynamic Standing Balance: fair: maintains balance at CGA without use of UE support  Comments: CGA amb without AD; SBA washing hands at sink with UE support    Other Therapeutic Interventions    Functional Outcomes  AM-PAC Inpatient Mobility Raw Score : 19              Cognition  WFL  Orientation:    alert and oriented x 4  Command Following:   [facility]    Education  Barriers To Learning: none  Patient Education: patient educated on goals, PT role and benefits, plan of care  Learning Assessment:  patient verbalizes and demonstrates understanding    Assessment  Activity Tolerance: seated: 95% on 1L, HR 73, BP 94/54, MAP 68; SpO2=94-95% during amb in room on 1L; RN aware  Impairments Requiring Therapeutic Intervention: decreased functional mobility, decreased strength, decreased endurance, decreased balance  Prognosis: good   Clinical Assessment:   Pt is limited by the above deficits and would benefit from skilled PT services to maximize pt functional mobility and safety prior to discharge. Pt is slightly below baseline and will have 24/7 assistance from family at discharge. Recommend use of RW for safety upon discharge as pt regains strength and endurance.    Safety Interventions: patient left in chair, call light within reach, gait belt, patient at risk for falls, nurse notified and family/caregiver present- discussed with nursing, Karen Baeza, pt ok to be up without chair alarm; pt's daughter in room and assisting her to/from bathroom; pt is calling appropriately with needs    Plan  Frequency: 3-5 x/per week  Current Treatment Recommendations: strengthening, balance training, functional mobility training, transfer training, gait training, stair training and endurance training    Goals  Patient Goals: discharge   Short Term Goals:  Time Frame: discharge  Patient will complete bed mobility at modified independent   Patient will complete transfers at modified independent   Patient will ambulate 100 ft with use of rolling walker at supervision  Patient will ascend/descend 2 stairs with handrail at contact guard assistance    Therapy Session Time      Individual Group Co-treatment   Time In     7943   Time Out     1433   Minutes     40     Timed Code Treatment Minutes:  30 Minutes  Total Treatment Minutes:  40       Electronically Signed By: Wesley Veras PT

## 2022-05-06 NOTE — RT PROTOCOL NOTE
RT Inhaler-Nebulizer Bronchodilator Protocol Note    There is a bronchodilator order in the chart from a provider indicating to follow the RT Bronchodilator Protocol and there is an Initiate RT Inhaler-Nebulizer Bronchodilator Protocol order as well (see protocol at bottom of note). CXR Findings:  XR CHEST PORTABLE    Result Date: 5/5/2022  No acute process. The findings from the last RT Protocol Assessment were as follows:   History Pulmonary Disease: None or smoker <15 pack years  Respiratory Pattern: Regular pattern and RR 12-20 bpm  Breath Sounds: Clear breath sounds  Cough: Strong, spontaneous, non-productive  Indication for Bronchodilator Therapy:    Bronchodilator Assessment Score: 0    Aerosolized bronchodilator medication orders have been revised according to the RT Inhaler-Nebulizer Bronchodilator Protocol below. Respiratory Therapist to perform RT Therapy Protocol Assessment initially then follow the protocol. Repeat RT Therapy Protocol Assessment PRN for score 0-3 or on second treatment, BID, and PRN for scores above 3. No Indications - adjust the frequency to every 6 hours PRN wheezing or bronchospasm, if no treatments needed after 48 hours then discontinue using Per Protocol order mode. If indication present, adjust the RT bronchodilator orders based on the Bronchodilator Assessment Score as indicated below. Use Inhaler orders unless patient has one or more of the following: on home nebulizer, not able to hold breath for 10 seconds, is not alert and oriented, cannot activate and use MDI correctly, or respiratory rate 25 breaths per minute or more, then use the equivalent nebulizer order(s) with same Frequency and PRN reasons based on the score. If a patient is on this medication at home then do not decrease Frequency below that used at home.     0-3 - enter or revise RT bronchodilator order(s) to equivalent RT Bronchodilator order with Frequency of every 4 hours PRN for wheezing or increased work of breathing using Per Protocol order mode. 4-6  enter or revise RT Bronchodilator order(s) to two equivalent RT bronchodilator orders with one order with BID Frequency and one order with Frequency of every 4 hours PRN wheezing or increased work of breathing using Per Protocol order mode. 7-10  enter or revise RT Bronchodilator order(s) to two equivalent RT bronchodilator orders with one order with TID Frequency and one order with Frequency of every 4 hours PRN wheezing or increased work of breathing using Per Protocol order mode. 11-13  enter or revise RT Bronchodilator order(s) to one equivalent RT bronchodilator order with QID Frequency and an Albuterol order with Frequency of every 4 hours PRN wheezing or increased work of breathing using Per Protocol order mode. Greater than 13  enter or revise RT Bronchodilator order(s) to one equivalent RT bronchodilator order with every 4 hours Frequency and an Albuterol order with Frequency of every 2 hours PRN wheezing or increased work of breathing using Per Protocol order mode. RT to enter RT Home Evaluation for COPD & MDI Assessment order using Per Protocol order mode.     Electronically signed by Kasey Banerjee RCP on 5/6/2022 at 7:09 PM

## 2022-05-06 NOTE — PLAN OF CARE
Pt assisted to restroom to use toilet. Gown changed, tele stickers removed. Pt oxygen decreased/titrated to 1L NC. Pt aware she will go for ECHO- Ticket printed and placed in chart. Pt tolerated walking to restroom with walker due to being und=steady on feet. Call light in reach. Bed in lowest position. Bed alarm on and functioning.      Lexx JASSON, RN

## 2022-05-06 NOTE — H&P
HOSPITALISTS HISTORY AND PHYSICAL    5/5/2022 9:33 PM    Patient Information:  Shanti Howard is a 80 y.o. female [id number]  PCP:  Chava Jovel (Tel: None )    Chief complaint:    Chief Complaint   Patient presents with    Cough     non productive cough with sob during ambulation since sunday, negative covid test        History of Present Illness:  Derick Ngo is a 80 y.o. female presented with c/o cough and dyspnea on going for the past 4-5 days. . No h/h CHF   Lab work up showed elevated pro bnp 1186. The pt became hypoxic upon ambulation with sats dropping down to high 80's. Improving with rest. She does not wear O2 at home   She was given a dose of IV lasix    Rest of the lab work is unremarkable. Chest xray is neg for Pneumonia or edema. Rapid COVID and influenza screen neg  . Troponin < 0.01. EKG showed NSR     REVIEW OF SYSTEMS:   Constitutional: Negative for fever,chills or night sweats  ENT: Negative for rhinorrhea, epistaxis, hoarseness, sore throat. Respiratory: +Ve  for shortness of breath,wheezing  Cardiovascular: Negative for chest pain, palpitations   Gastrointestinal: Negative for nausea, vomiting, diarrhea  Genitourinary: Negative for polyuria, dysuria   Hematologic/Lymphatic: Negative for bleeding tendency, easy bruising  Musculoskeletal: Negative for myalgias and arthralgias  Neurologic: Negative for confusion,dysarthria. Skin: Negative for itching,rash  Psychiatric: Negative for depression,anxiety, agitation. Endocrine: Negative for polydipsia,polyuria,heat /cold intolerance. Past Medical History:   has a past medical history of CAD (coronary artery disease), Hyperlipidemia, and Hypertension. Past Surgical History:   has a past surgical history that includes Cardiac surgery and Cholecystectomy.      Medications:  No current facility-administered medications on file prior to encounter.      Current Outpatient Medications on File Prior to Encounter   Medication Sig Dispense Refill    aspirin 81 MG EC tablet Take 81 mg by mouth daily      isosorbide mononitrate (IMDUR) 30 MG extended release tablet Take 30 mg by mouth daily      hydroCHLOROthiazide (HYDRODIURIL) 25 MG tablet Take 25 mg by mouth daily      lisinopril (PRINIVIL;ZESTRIL) 10 MG tablet Take 10 mg by mouth daily      Coenzyme Q10 (CO Q-10) 200 MG CAPS Take 200 mg by mouth      Cholecalciferol (VITAMIN D3) 50 MCG (2000 UT) CAPS Take by mouth      metoprolol tartrate (LOPRESSOR) 50 MG tablet Take 50 mg by mouth daily 1 1/2 tabs      zinc 50 MG TABS tablet Take 50 mg by mouth daily      Omega-3 Fatty Acids (FISH OIL) 1000 MG CAPS Take 3,000 mg by mouth daily      CRANBERRY EXTRACT PO Take by mouth       Current Facility-Administered Medications   Medication Dose Route Frequency Provider Last Rate Last Admin    [START ON 5/6/2022] aspirin EC tablet 81 mg  81 mg Oral Daily Vicky Space, MD Creston Sicard Lonn Sarah ON 5/6/2022] isosorbide mononitrate (IMDUR) extended release tablet 30 mg  30 mg Oral Daily Vicky Space, MD Creston Sicard Lonn Sarah ON 5/6/2022] lisinopril (PRINIVIL;ZESTRIL) tablet 10 mg  10 mg Oral Daily Vicky Space, MD Creston Sicard Lonn Sarah ON 5/6/2022] furosemide (LASIX) injection 20 mg  20 mg IntraVENous Daily Batool Nevarez MD        sodium chloride flush 0.9 % injection 5-40 mL  5-40 mL IntraVENous 2 times per day Batool Nevarez MD   10 mL at 05/05/22 2059    sodium chloride flush 0.9 % injection 5-40 mL  5-40 mL IntraVENous PRN Batool Nevarez MD        0.9 % sodium chloride infusion   IntraVENous PRDIAMOND Nevarez MD        enoxaparin (LOVENOX) injection 40 mg  40 mg SubCUTAneous Nightly Gertrude Meng MD   40 mg at 05/05/22 2059    ondansetron (ZOFRAN-ODT) disintegrating tablet 4 mg  4 mg Oral Q8H PRN Batool Nevarez MD        Or    ondansetron (ZOFRAN) injection 4 mg  4 mg IntraVENous Q6H PRN Vicky Space, MD Creston Sicard polyethylene glycol (GLYCOLAX) packet 17 g  17 g Oral Daily PRN Chad Carlson MD        acetaminophen (TYLENOL) tablet 650 mg  650 mg Oral Q6H PRN Chad Carlson MD        Or   Floydene Chloe acetaminophen (TYLENOL) suppository 650 mg  650 mg Rectal Q6H PRN Chad Carlson, MD Thom Corona Ayden Banda ON 5/6/2022] metoprolol succinate (TOPROL XL) extended release tablet 75 mg  75 mg Oral Daily Chad Carlson, MD           Allergies:  No Known Allergies     Social History:  Patient Lives    reports that she has quit smoking. She has never used smokeless tobacco. She reports that she does not drink alcohol and does not use drugs. Family History:  family history is not on file. ,     Physical Exam:  /72   Pulse 70   Temp 97.7 °F (36.5 °C) (Oral)   Resp 18   Ht 4' 9\" (1.448 m)   Wt 155 lb (70.3 kg)   SpO2 96%   BMI 33.54 kg/m²     General appearance:  Appears comfortable. Well nourished  Eyes: Sclera clear, pupils equal  ENT: Moist mucus membranes, no thrush. Trachea midline. Cardiovascular: Regular rhythm, normal S1, S2. No murmur, gallop, rub. No edema in lower extremities  Respiratory: Clear to auscultation bilaterally, no wheeze, good inspiratory effort  Gastrointestinal: Abdomen soft, non-tender, not distended, normal bowel sounds  Musculoskeletal: No cyanosis in digits, neck supple  Neurology: Cranial nerves grossly intact. Alert and oriented in time, place and person. No speech or motor deficits  Psychiatry: Appropriate affect.  Not agitated  Skin: Warm, dry, normal turgor, no rash  Brisk capillary refill, peripheral pulses palpable   Labs:  CBC:   Lab Results   Component Value Date    WBC 5.2 05/05/2022    RBC 3.91 05/05/2022    HGB 12.8 05/05/2022    HCT 38.3 05/05/2022    MCV 97.9 05/05/2022    MCH 32.8 05/05/2022    MCHC 33.5 05/05/2022    RDW 13.2 05/05/2022     05/05/2022    MPV 9.5 05/05/2022     BMP:    Lab Results   Component Value Date     05/05/2022    K 3.9 05/05/2022    CL 97 05/05/2022    CO2 25 05/05/2022    BUN 22 05/05/2022    CREATININE 0.7 05/05/2022    CALCIUM 9.4 05/05/2022    GFRAA >60 05/05/2022    LABGLOM >60 05/05/2022    GLUCOSE 124 05/05/2022     XR CHEST PORTABLE   Final Result   No acute process. Chest Xray:   EKG:    I visualized CXR images and EKG strips    Discussed case  with     Problem List  Principal Problem:    Peripheral edema  Resolved Problems:    * No resolved hospital problems. *        Assessment/Plan:   Peripheral edema   Elevated pro bnp 1186  ED started IV lasix   Will cont Lasix  Cardiac ECHO ordered     Dyspnea and ambulatory hypoxia  Sats ok on RA  Provide supplemental O2 to keep sats around 92 %     DVT prophylaxis   Code status   Diet   IV access   [place]aga Catheter    Admit as inpatient. I anticipate hospitalization spanning more than two midnights for investigation and treatment of the above medically necessary diagnoses. Please note that some part of this chart was generated using Dragon dictation software. Although every effort was made to ensure the accuracy of this automated transcription, some errors in transcription may have occurred inadvertently. If you may need any clarification, please do not hesitate to contact me through [facility].        Bubba Varela MD    5/5/2022 9:33 PM

## 2022-05-07 ENCOUNTER — APPOINTMENT (OUTPATIENT)
Dept: CT IMAGING | Age: 82
DRG: 947 | End: 2022-05-07
Payer: MEDICARE

## 2022-05-07 LAB
ANION GAP SERPL CALCULATED.3IONS-SCNC: 14 MMOL/L (ref 3–16)
BUN BLDV-MCNC: 38 MG/DL (ref 7–20)
CALCIUM SERPL-MCNC: 9.2 MG/DL (ref 8.3–10.6)
CHLORIDE BLD-SCNC: 99 MMOL/L (ref 99–110)
CO2: 25 MMOL/L (ref 21–32)
CREAT SERPL-MCNC: 1.1 MG/DL (ref 0.6–1.2)
GFR AFRICAN AMERICAN: 58
GFR NON-AFRICAN AMERICAN: 48
GLUCOSE BLD-MCNC: 118 MG/DL (ref 70–99)
HCT VFR BLD CALC: 37.9 % (ref 36–48)
HEMOGLOBIN: 13 G/DL (ref 12–16)
MCH RBC QN AUTO: 34 PG (ref 26–34)
MCHC RBC AUTO-ENTMCNC: 34.3 G/DL (ref 31–36)
MCV RBC AUTO: 99.2 FL (ref 80–100)
PDW BLD-RTO: 13.1 % (ref 12.4–15.4)
PLATELET # BLD: 124 K/UL (ref 135–450)
PMV BLD AUTO: 10.1 FL (ref 5–10.5)
POTASSIUM SERPL-SCNC: 4.2 MMOL/L (ref 3.5–5.1)
PROCALCITONIN: 0.06 NG/ML (ref 0–0.15)
RBC # BLD: 3.82 M/UL (ref 4–5.2)
SODIUM BLD-SCNC: 138 MMOL/L (ref 136–145)
WBC # BLD: 6.2 K/UL (ref 4–11)

## 2022-05-07 PROCEDURE — 6370000000 HC RX 637 (ALT 250 FOR IP): Performed by: NURSE PRACTITIONER

## 2022-05-07 PROCEDURE — 71260 CT THORAX DX C+: CPT

## 2022-05-07 PROCEDURE — 85027 COMPLETE CBC AUTOMATED: CPT

## 2022-05-07 PROCEDURE — 2700000000 HC OXYGEN THERAPY PER DAY

## 2022-05-07 PROCEDURE — 94640 AIRWAY INHALATION TREATMENT: CPT

## 2022-05-07 PROCEDURE — 2580000003 HC RX 258: Performed by: FAMILY MEDICINE

## 2022-05-07 PROCEDURE — 6370000000 HC RX 637 (ALT 250 FOR IP): Performed by: FAMILY MEDICINE

## 2022-05-07 PROCEDURE — 80048 BASIC METABOLIC PNL TOTAL CA: CPT

## 2022-05-07 PROCEDURE — 94761 N-INVAS EAR/PLS OXIMETRY MLT: CPT

## 2022-05-07 PROCEDURE — 36415 COLL VENOUS BLD VENIPUNCTURE: CPT

## 2022-05-07 PROCEDURE — 6360000004 HC RX CONTRAST MEDICATION: Performed by: INTERNAL MEDICINE

## 2022-05-07 PROCEDURE — 99223 1ST HOSP IP/OBS HIGH 75: CPT | Performed by: INTERNAL MEDICINE

## 2022-05-07 PROCEDURE — 84145 PROCALCITONIN (PCT): CPT

## 2022-05-07 PROCEDURE — 6360000002 HC RX W HCPCS: Performed by: FAMILY MEDICINE

## 2022-05-07 PROCEDURE — 1200000000 HC SEMI PRIVATE

## 2022-05-07 PROCEDURE — 93970 EXTREMITY STUDY: CPT

## 2022-05-07 RX ORDER — BENZONATATE 100 MG/1
100 CAPSULE ORAL 3 TIMES DAILY PRN
Qty: 15 CAPSULE | Refills: 0 | Status: SHIPPED | OUTPATIENT
Start: 2022-05-07 | End: 2022-05-12

## 2022-05-07 RX ORDER — IPRATROPIUM BROMIDE AND ALBUTEROL SULFATE 2.5; .5 MG/3ML; MG/3ML
1 SOLUTION RESPIRATORY (INHALATION)
Status: DISCONTINUED | OUTPATIENT
Start: 2022-05-07 | End: 2022-05-08 | Stop reason: HOSPADM

## 2022-05-07 RX ORDER — CODEINE PHOSPHATE AND GUAIFENESIN 10; 100 MG/5ML; MG/5ML
5 SOLUTION ORAL ONCE
Status: COMPLETED | OUTPATIENT
Start: 2022-05-07 | End: 2022-05-07

## 2022-05-07 RX ORDER — BENZONATATE 100 MG/1
100 CAPSULE ORAL 3 TIMES DAILY PRN
Status: DISCONTINUED | OUTPATIENT
Start: 2022-05-07 | End: 2022-05-08 | Stop reason: HOSPADM

## 2022-05-07 RX ORDER — GUAIFENESIN 600 MG/1
600 TABLET, EXTENDED RELEASE ORAL 2 TIMES DAILY
Status: DISCONTINUED | OUTPATIENT
Start: 2022-05-07 | End: 2022-05-08 | Stop reason: HOSPADM

## 2022-05-07 RX ADMIN — GUAIFENESIN 600 MG: 600 TABLET, EXTENDED RELEASE ORAL at 09:59

## 2022-05-07 RX ADMIN — BENZONATATE 100 MG: 100 CAPSULE ORAL at 18:56

## 2022-05-07 RX ADMIN — ASPIRIN 81 MG: 81 TABLET, COATED ORAL at 09:59

## 2022-05-07 RX ADMIN — IPRATROPIUM BROMIDE AND ALBUTEROL SULFATE 1 AMPULE: .5; 3 SOLUTION RESPIRATORY (INHALATION) at 12:15

## 2022-05-07 RX ADMIN — IPRATROPIUM BROMIDE AND ALBUTEROL SULFATE 1 AMPULE: .5; 3 SOLUTION RESPIRATORY (INHALATION) at 19:47

## 2022-05-07 RX ADMIN — GUAIFENESIN 600 MG: 600 TABLET, EXTENDED RELEASE ORAL at 20:49

## 2022-05-07 RX ADMIN — GUAIFENESIN AND CODEINE PHOSPHATE 5 ML: 100; 10 SOLUTION ORAL at 16:40

## 2022-05-07 RX ADMIN — ENOXAPARIN SODIUM 40 MG: 100 INJECTION SUBCUTANEOUS at 20:49

## 2022-05-07 RX ADMIN — IPRATROPIUM BROMIDE AND ALBUTEROL SULFATE 1 AMPULE: .5; 3 SOLUTION RESPIRATORY (INHALATION) at 15:30

## 2022-05-07 RX ADMIN — Medication 10 ML: at 20:49

## 2022-05-07 RX ADMIN — METOPROLOL SUCCINATE 75 MG: 50 TABLET, EXTENDED RELEASE ORAL at 09:58

## 2022-05-07 RX ADMIN — Medication 10 ML: at 09:59

## 2022-05-07 RX ADMIN — IOPAMIDOL 75 ML: 755 INJECTION, SOLUTION INTRAVENOUS at 14:51

## 2022-05-07 RX ADMIN — ISOSORBIDE MONONITRATE 30 MG: 30 TABLET, EXTENDED RELEASE ORAL at 09:58

## 2022-05-07 RX ADMIN — LISINOPRIL 10 MG: 10 TABLET ORAL at 09:58

## 2022-05-07 ASSESSMENT — PAIN DESCRIPTION - LOCATION: LOCATION: CHEST

## 2022-05-07 ASSESSMENT — PAIN DESCRIPTION - DESCRIPTORS: DESCRIPTORS: DISCOMFORT

## 2022-05-07 ASSESSMENT — PAIN DESCRIPTION - PAIN TYPE: TYPE: ACUTE PAIN

## 2022-05-07 ASSESSMENT — PAIN DESCRIPTION - ORIENTATION: ORIENTATION: MID

## 2022-05-07 ASSESSMENT — PAIN SCALES - GENERAL: PAINLEVEL_OUTOF10: 5

## 2022-05-07 ASSESSMENT — PAIN DESCRIPTION - FREQUENCY: FREQUENCY: INTERMITTENT

## 2022-05-07 NOTE — PROGRESS NOTES
Patients head to toe assessment completed. Vital signs WNL. Bed alarm engaged, call light within reach. Scheduled medications given per MAR. Patient denies any pain at the moment. Patient up to the bathroom. Will continue to monitor.

## 2022-05-07 NOTE — CONSULTS
[facility] Pulmonary and Critical Care   Consult Note      Reason for Consult: Hypoxemia  Requesting Physician: Dr. Analy Yancey:   [address] [facility]:                The patient is a 80 y.o. female with significant past medical history of:      Diagnosis Date    CAD (coronary artery disease)     Hyperlipidemia     Hypertension      Patient presents to the hospital with increasingly severe shortness of breath over several days duration. Also patient had increasing lower extremity edema. He has had diuresis since admission with improvement in edema. Despite this, she has remained hypoxemic.       Past Surgical History:        Procedure Laterality Date    CARDIAC SURGERY      CHOLECYSTECTOMY       Current Medications:    Current Facility-Administered Medications: ipratropium-albuterol (DUONEB) nebulizer solution 1 ampule, 1 ampule, Inhalation, Q4H WA  guaiFENesin (MUCINEX) extended release tablet 600 mg, 600 mg, Oral, BID  albuterol sulfate  (90 Base) MCG/ACT inhaler 2 puff, 2 puff, Inhalation, Q6H PRN  aspirin EC tablet 81 mg, 81 mg, Oral, Daily  isosorbide mononitrate (IMDUR) extended release tablet 30 mg, 30 mg, Oral, Daily  lisinopril (PRINIVIL;ZESTRIL) tablet 10 mg, 10 mg, Oral, Daily  sodium chloride flush 0.9 % injection 5-40 mL, 5-40 mL, IntraVENous, 2 times per day  sodium chloride flush 0.9 % injection 5-40 mL, 5-40 mL, IntraVENous, PRN  0.9 % sodium chloride infusion, , IntraVENous, PRN  enoxaparin (LOVENOX) injection 40 mg, 40 mg, SubCUTAneous, Nightly  ondansetron (ZOFRAN-ODT) disintegrating tablet 4 mg, 4 mg, Oral, Q8H PRN **OR** ondansetron (ZOFRAN) injection 4 mg, 4 mg, IntraVENous, Q6H PRN  polyethylene glycol (GLYCOLAX) packet 17 g, 17 g, Oral, Daily PRN  acetaminophen (TYLENOL) tablet 650 mg, 650 mg, Oral, Q6H PRN **OR** acetaminophen (TYLENOL) suppository 650 mg, 650 mg, Rectal, Q6H PRN  metoprolol succinate (TOPROL XL) extended release tablet 75 mg, 75 mg, Oral, Daily    No Known Allergies    Social History:    TOBACCO:   reports that she has quit smoking. She has never used smokeless tobacco.  ETOH:   reports no history of alcohol use. Patient currently lives independently  Environmental/chemical exposure: None known    Family History:   No family history on file. REVIEW OF SYSTEMS:    CONSTITUTIONAL:  negative for fevers, chills, diaphoresis, activity change, appetite change, fatigue, night sweats and unexpected weight change. EYES:  negative for blurred vision, eye discharge, visual disturbance and icterus  HEENT:  negative for hearing loss, tinnitus, ear drainage, sinus pressure, nasal congestion, epistaxis and snoring  RESPIRATORY:  See HPI  CARDIOVASCULAR:  negative for chest pain, palpitations, exertional chest pressure/discomfort, edema, syncope  GASTROINTESTINAL:  negative for nausea, vomiting, diarrhea, constipation, blood in stool and abdominal pain  GENITOURINARY:  negative for frequency, dysuria, urinary incontinence, decreased urine volume, and hematuria  HEMATOLOGIC/LYMPHATIC:  negative for easy bruising, bleeding and lymphadenopathy  ALLERGIC/IMMUNOLOGIC:  negative for recurrent infections, angioedema, anaphylaxis and drug reactions  ENDOCRINE:  negative for weight changes and diabetic symptoms including polyuria, polydipsia and polyphagia  MUSCULOSKELETAL:  negative for  pain, joint swelling, decreased range of motion and muscle weakness  NEUROLOGICAL:  negative for headaches, slurred speech, unilateral weakness  PSYCHIATRIC/BEHAVIORAL: negative for hallucinations, behavioral problems, confusion and agitation.      Objective:   PHYSICAL EXAM:      VITALS:  /73   Pulse 77   Temp 97.6 °F (36.4 °C) (Oral)   Resp 18   Ht 4' 9\" (1.448 m)   Wt 155 lb (70.3 kg)   SpO2 91%   BMI 33.54 kg/m²      24HR INTAKE/OUTPUT:      Intake/Output Summary (Last 24 hours) at 5/7/2022 1326  Last data filed at 5/7/2022 1325  Gross per 24 hour   Intake 580 ml   Output 150 ml   Net 430 ml     CONSTITUTIONAL:  awake, alert, cooperative, no apparent distress, and appears stated age  NECK:  Supple, symmetrical, trachea midline, no adenopathy, thyroid symmetric, not enlarged and no tenderness, skin normal  LUNGS:  no increased work of breathing and clear to auscultation. No accessory muscle use  CARDIOVASCULAR: S1 and S2, no edema and no JVD  ABDOMEN:  normal bowel sounds, non-distended and no masses palpated, and no tenderness to palpation. No hepatospleenomegaly  LYMPHADENOPATHY:  no axillary or supraclavicular adenopathy. No cervical adnenopathy  PSYCHIATRIC: Oriented to person place and time. No obvious depression or anxiety. MUSCULOSKELETAL: No obvious misalignment or effusion of the joints. No clubbing, cyanosis of the digits. SKIN:  normal skin color, texture, turgor and no redness, warmth, or swelling. No palpable nodules    DATA:    Old records have been reviewed    CBC:  Recent Labs     05/05/22  1350 05/06/22  0528 05/07/22  0502   WBC 5.2 4.9 6.2   RBC 3.91* 3.87* 3.82*   HGB 12.8 12.7 13.0   HCT 38.3 38.0 37.9    133* 124*   MCV 97.9 98.4 99.2   MCH 32.8 32.9 34.0   MCHC 33.5 33.5 34.3   RDW 13.2 13.1 13.1      BMP:  Recent Labs     05/05/22  1350 05/06/22  0528 05/07/22  0502   * 137 138   K 3.9 3.9 4.2   CL 97* 99 99   CO2 25 26 25   BUN 22* 24* 38*   CREATININE 0.7 0.7 1.1   CALCIUM 9.4 9.4 9.2   GLUCOSE 124* 117* 118*      ABG:  No results for input(s): PHART, KZH9HPF, PO2ART, WOW1QXP, G2HZCDPZ, BEART in the last 72 hours. Procalcitonin  Recent Labs     05/07/22  0502   PROCAL 0.06       No results found for: BNP  Lab Results   Component Value Date    TROPONINI <0.01 05/05/2022           Radiology Review:  All pertinent images / reports were reviewed as a part of this visit. Assessment:     1.  Acute hypoxemic respiratory failure      Plan:     I have reviewed laboratories, medical records and images for this visit  Initial chest x-ray is clear  Echocardiogram revealed good LV function and no diastolic dysfunction  She has diuresed well since admission. Procalcitonin is normal.  She has no leukocytosis and has remained afebrile. Despite this continues to have an oxygen requirement  Would recommend CT angiogram to rule out pulmonary embolism. Lower extremity Dopplers were negative for DVT.

## 2022-05-07 NOTE — PLAN OF CARE
Problem: Safety - Adult  Goal: Free from fall injury  5/6/2022 2115 by Concepcion Mathias RN  Outcome: Progressing     Problem: Pain  Goal: Verbalizes/displays adequate comfort level or baseline comfort level  5/6/2022 2115 by Concepcion Mathias RN  Outcome: Progressing  Patient denies pain this shift

## 2022-05-07 NOTE — PROGRESS NOTES
Dopplers completed at bedside. Shift assessment completed. AM meds given. Vitals obtained. Pt denies pain and expresses no needs. Family at bedside. Call light in reach. Fall precautions in place.

## 2022-05-07 NOTE — PROGRESS NOTES
[facility] PROGRESS NOTE    5/7/2022 8:39 AM        Name: Bryn Washington . Admitted: 5/5/2022  Primary Care Provider: Chase Fournier (Tel: None)      Chief Complaint   Patient presents with    Cough     non productive cough with sob during ambulation since sunday, negative covid test     Hospital Course: Patient is an 79 yo female with hx CAD, HTN, HLD. She presented to ER with shortness of breath and BLE edema. No acute process on CXR, COVID and influenza screen negative. ProBNP elevated 1186 and started on IV Lasix. Noted to have ambulatory hypoxia in ER with O2 sat 87%. Subjective:  Resting in bed,  and daughter at bedside. Main complaint is continued shortness of breath with ambulation and cough productive of yellow sputum. Denies post nasal drip. Still requiring O2 up to 3 liters with ambulation. BUN/creatinine trending up, BP on soft side. Denies chest pain, palpitations, lightheadedness, abdominal pain. BLE edema improving.      Reviewed interval ancillary notes    Current Medications  albuterol sulfate  (90 Base) MCG/ACT inhaler 2 puff, Q6H PRN  aspirin EC tablet 81 mg, Daily  isosorbide mononitrate (IMDUR) extended release tablet 30 mg, Daily  lisinopril (PRINIVIL;ZESTRIL) tablet 10 mg, Daily  furosemide (LASIX) injection 20 mg, Daily  sodium chloride flush 0.9 % injection 5-40 mL, 2 times per day  sodium chloride flush 0.9 % injection 5-40 mL, PRN  0.9 % sodium chloride infusion, PRN  enoxaparin (LOVENOX) injection 40 mg, Nightly  ondansetron (ZOFRAN-ODT) disintegrating tablet 4 mg, Q8H PRN   Or  ondansetron (ZOFRAN) injection 4 mg, Q6H PRN  polyethylene glycol (GLYCOLAX) packet 17 g, Daily PRN  acetaminophen (TYLENOL) tablet 650 mg, Q6H PRN   Or  acetaminophen (TYLENOL) suppository 650 mg, Q6H PRN  metoprolol succinate (TOPROL XL) extended release tablet 75 mg, Daily        Objective:  /60   Pulse 77   Temp 97.7 °F (36.5 °C) (Oral)   Resp 18   Ht 4' 9\" (1.448 m)   Wt 155 lb (70.3 kg)   SpO2 98%   BMI 33.54 kg/m²     Intake/Output Summary (Last 24 hours) at 5/7/2022 0839  Last data filed at 5/6/2022 2314  Gross per 24 hour   Intake 560 ml   Output 350 ml   Net 210 ml      Wt Readings from Last 3 Encounters:   05/05/22 155 lb (70.3 kg)       General appearance:  Appears comfortable  Eyes: Sclera clear. Pupils equal.  ENT: Moist oral mucosa. Trachea midline, no adenopathy. Cardiovascular: Regular rhythm, normal S1, S2. No murmur. trace edema in lower extremities  Respiratory: Not using accessory muscles. Good inspiratory effort. Clear to auscultation bilaterally, no wheeze or crackles. GI: Abdomen soft, no tenderness, not distended, normal bowel sounds  Musculoskeletal: No cyanosis in digits, neck supple  Neurology: CN 2-12 grossly intact. No speech or motor deficits  Psych: Normal affect. Alert and oriented in time, place and person  Skin: Warm, dry, normal turgor    Labs and Tests:  CBC:   Recent Labs     05/05/22  1350 05/06/22  0528 05/07/22  0502   WBC 5.2 4.9 6.2   HGB 12.8 12.7 13.0    133* 124*     BMP:    Recent Labs     05/05/22  1350 05/06/22  0528 05/07/22  0502   * 137 138   K 3.9 3.9 4.2   CL 97* 99 99   CO2 25 26 25   BUN 22* 24* 38*   CREATININE 0.7 0.7 1.1   GLUCOSE 124* 117* 118*     Hepatic:   Recent Labs     05/05/22  1350   AST 29   ALT 23   BILITOT 0.9   ALKPHOS 111       CXR 5/5/2022:  No acute process. Echo 5/5/2022:  Summary   Technically limited study due to patient body habitus. Normal left ventricular cavity size. Moderate septal left ventricular   hypertrophy. Hyperdynamic left ventricular systolic function with an   estimated ejection fraction of 20-48%   Normal diastolic function for age. Mild pulmonic insufficiency. Grade I diastolic dysfunction with normal LV filling pressures.       Problem List  Principal Problem:    Peripheral edema  Resolved Problems:    * No resolved hospital problems. *       Assessment & Plan:   1. Peripheral edema. EF 12-01%, grade I diastolic dysfunction. Received IV Lasix in ER and continued on admission. Creatinine 0.7 on presentation, 1.1 today. DC IV Lasix. Check doppler study BLE. 2. Shortness of breath / ambulatory hypoxia. O2 sat documented as 87% on room air at rest (5/6), required 3 liters with ambulation for O2 sat 92%. No hx chronic lung disease. Has diuresed, contniues to note exertional dyspnea. Check CT pulmonary to r/o PE. Consult pulmonary. 3. HTN. Controlled. Continue lisinopril, isosorbide, metoprolol. Continue to follow. 4. Hyponatremia. HCTZ held on admission. Improved. Diet: ADULT DIET;  Regular  Code:Full Code  DVT PPX: enoxaparin      KYLE Ponce - CNP   5/7/2022 8:39 AM

## 2022-05-07 NOTE — PROGRESS NOTES
Pt c/o a coughing fit, one time dose of robitussin and tessalon ordered. Robitussin administered. Pt expresses no needs.

## 2022-05-08 VITALS
SYSTOLIC BLOOD PRESSURE: 123 MMHG | OXYGEN SATURATION: 96 % | RESPIRATION RATE: 18 BRPM | BODY MASS INDEX: 33.44 KG/M2 | HEIGHT: 57 IN | HEART RATE: 79 BPM | WEIGHT: 155 LBS | DIASTOLIC BLOOD PRESSURE: 67 MMHG | TEMPERATURE: 97.9 F

## 2022-05-08 LAB
ANION GAP SERPL CALCULATED.3IONS-SCNC: 12 MMOL/L (ref 3–16)
BUN BLDV-MCNC: 42 MG/DL (ref 7–20)
CALCIUM SERPL-MCNC: 9 MG/DL (ref 8.3–10.6)
CHLORIDE BLD-SCNC: 100 MMOL/L (ref 99–110)
CO2: 23 MMOL/L (ref 21–32)
CREAT SERPL-MCNC: 1 MG/DL (ref 0.6–1.2)
GFR AFRICAN AMERICAN: >60
GFR NON-AFRICAN AMERICAN: 53
GLUCOSE BLD-MCNC: 116 MG/DL (ref 70–99)
HCT VFR BLD CALC: 35 % (ref 36–48)
HEMOGLOBIN: 11.8 G/DL (ref 12–16)
MCH RBC QN AUTO: 33.1 PG (ref 26–34)
MCHC RBC AUTO-ENTMCNC: 33.9 G/DL (ref 31–36)
MCV RBC AUTO: 97.7 FL (ref 80–100)
PDW BLD-RTO: 13.2 % (ref 12.4–15.4)
PLATELET # BLD: 127 K/UL (ref 135–450)
PMV BLD AUTO: 10 FL (ref 5–10.5)
POTASSIUM SERPL-SCNC: 4.1 MMOL/L (ref 3.5–5.1)
RBC # BLD: 3.58 M/UL (ref 4–5.2)
SODIUM BLD-SCNC: 135 MMOL/L (ref 136–145)
TROPONIN: <0.01 NG/ML
WBC # BLD: 6.9 K/UL (ref 4–11)

## 2022-05-08 PROCEDURE — 36415 COLL VENOUS BLD VENIPUNCTURE: CPT

## 2022-05-08 PROCEDURE — 6370000000 HC RX 637 (ALT 250 FOR IP): Performed by: NURSE PRACTITIONER

## 2022-05-08 PROCEDURE — 85027 COMPLETE CBC AUTOMATED: CPT

## 2022-05-08 PROCEDURE — 94761 N-INVAS EAR/PLS OXIMETRY MLT: CPT

## 2022-05-08 PROCEDURE — 80048 BASIC METABOLIC PNL TOTAL CA: CPT

## 2022-05-08 PROCEDURE — 84484 ASSAY OF TROPONIN QUANT: CPT

## 2022-05-08 PROCEDURE — 2700000000 HC OXYGEN THERAPY PER DAY

## 2022-05-08 PROCEDURE — 94640 AIRWAY INHALATION TREATMENT: CPT

## 2022-05-08 PROCEDURE — 6370000000 HC RX 637 (ALT 250 FOR IP): Performed by: FAMILY MEDICINE

## 2022-05-08 PROCEDURE — 99233 SBSQ HOSP IP/OBS HIGH 50: CPT | Performed by: INTERNAL MEDICINE

## 2022-05-08 PROCEDURE — 2580000003 HC RX 258: Performed by: FAMILY MEDICINE

## 2022-05-08 PROCEDURE — 93005 ELECTROCARDIOGRAM TRACING: CPT | Performed by: NURSE PRACTITIONER

## 2022-05-08 PROCEDURE — 94680 O2 UPTK RST&XERS DIR SIMPLE: CPT

## 2022-05-08 RX ORDER — NITROGLYCERIN 0.4 MG/1
0.4 TABLET SUBLINGUAL EVERY 5 MIN PRN
Status: DISCONTINUED | OUTPATIENT
Start: 2022-05-08 | End: 2022-05-08 | Stop reason: HOSPADM

## 2022-05-08 RX ORDER — LEVALBUTEROL TARTRATE 45 UG/1
1 AEROSOL, METERED ORAL EVERY 4 HOURS PRN
Qty: 1 EACH | Refills: 2 | Status: SHIPPED | OUTPATIENT
Start: 2022-05-08 | End: 2023-05-08

## 2022-05-08 RX ADMIN — LISINOPRIL 10 MG: 10 TABLET ORAL at 08:14

## 2022-05-08 RX ADMIN — ASPIRIN 81 MG: 81 TABLET, COATED ORAL at 08:15

## 2022-05-08 RX ADMIN — NITROGLYCERIN 0.4 MG: 0.4 TABLET, ORALLY DISINTEGRATING SUBLINGUAL at 10:01

## 2022-05-08 RX ADMIN — Medication 10 ML: at 08:16

## 2022-05-08 RX ADMIN — IPRATROPIUM BROMIDE AND ALBUTEROL SULFATE 1 AMPULE: .5; 3 SOLUTION RESPIRATORY (INHALATION) at 16:09

## 2022-05-08 RX ADMIN — METOPROLOL SUCCINATE 75 MG: 50 TABLET, EXTENDED RELEASE ORAL at 08:15

## 2022-05-08 RX ADMIN — ISOSORBIDE MONONITRATE 30 MG: 30 TABLET, EXTENDED RELEASE ORAL at 08:14

## 2022-05-08 RX ADMIN — GUAIFENESIN 600 MG: 600 TABLET, EXTENDED RELEASE ORAL at 08:14

## 2022-05-08 RX ADMIN — IPRATROPIUM BROMIDE AND ALBUTEROL SULFATE 1 AMPULE: .5; 3 SOLUTION RESPIRATORY (INHALATION) at 08:41

## 2022-05-08 NOTE — PROGRESS NOTES
[facility] PROGRESS NOTE    5/8/2022 9:36 AM        Name: Svitlana Cuellar . Admitted: 5/5/2022  Primary Care Provider: Jessica Guthrie (Tel: None)      Chief Complaint   Patient presents with    Cough     non productive cough with sob during ambulation since sunday, negative covid test     Hospital Course: Patient is an 81 yo female with hx CAD, HTN, HLD. She presented to ER with shortness of breath and BLE edema. No acute process on CXR, COVID and influenza screen negative. ProBNP elevated 1186 and started on IV Lasix. Noted to have ambulatory hypoxia in ER with O2 sat 87%. Subjective:  Resting in bed, daughter at bedside. Patient reports she feels better today. Appears to me to be coughing more today than yesterday during my visit. Cough non-productive. Offers c/o substernal chest ache which started this am. Says it feels like typical angina. No associated dyspnea, nausea, or diaphoresis. No tenderness to chest wall on palpation.       Reviewed interval ancillary notes    Current Medications  ipratropium-albuterol (DUONEB) nebulizer solution 1 ampule, Q4H WA  guaiFENesin (MUCINEX) extended release tablet 600 mg, BID  benzonatate (TESSALON) capsule 100 mg, TID PRN  albuterol sulfate  (90 Base) MCG/ACT inhaler 2 puff, Q6H PRN  aspirin EC tablet 81 mg, Daily  isosorbide mononitrate (IMDUR) extended release tablet 30 mg, Daily  lisinopril (PRINIVIL;ZESTRIL) tablet 10 mg, Daily  sodium chloride flush 0.9 % injection 5-40 mL, 2 times per day  sodium chloride flush 0.9 % injection 5-40 mL, PRN  0.9 % sodium chloride infusion, PRN  enoxaparin (LOVENOX) injection 40 mg, Nightly  ondansetron (ZOFRAN-ODT) disintegrating tablet 4 mg, Q8H PRN   Or  ondansetron (ZOFRAN) injection 4 mg, Q6H PRN  polyethylene glycol (GLYCOLAX) packet 17 g, Daily PRN  acetaminophen (TYLENOL) tablet 650 mg, Q6H PRN   Or  acetaminophen (TYLENOL) suppository 650 mg, Q6H PRN  metoprolol succinate (TOPROL XL) extended release tablet 75 mg, Daily        Objective:  /67   Pulse 79   Temp 97.9 °F (36.6 °C) (Oral)   Resp 18   Ht 4' 9\" (1.448 m)   Wt 155 lb (70.3 kg)   SpO2 96%   BMI 33.54 kg/m²     Intake/Output Summary (Last 24 hours) at 5/8/2022 0936  Last data filed at 5/7/2022 1325  Gross per 24 hour   Intake 120 ml   Output --   Net 120 ml      Wt Readings from Last 3 Encounters:   05/05/22 155 lb (70.3 kg)     General:  Awake, alert, oriented in NAD  Skin:  Warm and dry. No unusual bruising or rash  Neck:  Supple. No JVD appreciated  Chest:  Normal effort. No wheezes/rhonchi/rales  Cardiovascular:  RRR, normal S1/S2, no murmur/gallop/rub  Abdomen:  Soft, nontender, +bowel sounds  Extremities:  No edema  Neurological: No focal deficits  Psychological: Normal mood and affect    Labs and Tests:  CBC:   Recent Labs     05/06/22  0528 05/07/22  0502 05/08/22  0603   WBC 4.9 6.2 6.9   HGB 12.7 13.0 11.8*   * 124* 127*     BMP:    Recent Labs     05/06/22  0528 05/07/22  0502 05/08/22  0603    138 135*   K 3.9 4.2 4.1   CL 99 99 100   CO2 26 25 23   BUN 24* 38* 42*   CREATININE 0.7 1.1 1.0   GLUCOSE 117* 118* 116*     Hepatic:   Recent Labs     05/05/22  1350   AST 29   ALT 23   BILITOT 0.9   ALKPHOS 111       CXR 5/5/2022:  No acute process. Echo 5/5/2022:  Summary   Technically limited study due to patient body habitus. Normal left ventricular cavity size. Moderate septal left ventricular   hypertrophy. Hyperdynamic left ventricular systolic function with an   estimated ejection fraction of 75-44%   Normal diastolic function for age. Mild pulmonic insufficiency. Grade I diastolic dysfunction with normal LV filling pressures. Problem List  Principal Problem:    Peripheral edema  Resolved Problems:    * No resolved hospital problems. *       Assessment & Plan:   1. Peripheral edema.  EF 66-59%, grade I diastolic dysfunction. Received IV Lasix in ER and continued on admission, DCd yesterday for increasing BUN/creatinine. Doppler study negative for PE.   2. Shortness of breath / ambulatory hypoxia / cough. O2 sat documented as 87% on room air at rest (5/6), required 3 liters with ambulation for O2 sat 92%. No hx chronic lung disease. COVID19 and influenza screen negative. CT pulmonary negative for PE. Appreciate pulmonary recs. 3. HTN. Controlled. Continue lisinopril, isosorbide, metoprolol. Continue to follow. 4. Hyponatremia. HCTZ held on admission. Improved, stable. Continue to follow. 5. CAD / chest pain. Hx multiple stents. Offers c/o substernal chest ache this am, says feels like [place]. \" Check EKG, add on troponin level, NTG prn. Echo 5/5 with normal wall motion. Continue isosorbide. Diet: ADULT DIET;  Regular  Code:Full Code  DVT PPX: enoxaparin      KYLE Barrientos CNP   5/8/2022 9:36 AM

## 2022-05-08 NOTE — DISCHARGE SUMMARY
[facility]    Patient Demographics    Patient. Susanna Maldonado  Date of Birth. 1940  MRN. [id number]     Primary care provider. Ross Grayson  (Tel: None)    Admit date: 5/5/2022    Discharge date (blank if same as Note Date): Note Date: 5/8/2022     Reason for Hospitalization. Chief Complaint   Patient presents with    Cough     non productive cough with sob during ambulation since sunday, negative covid test         Significant Findings. Principal Problem:    Peripheral edema  Resolved Problems:    * No resolved hospital problems. *       Problems and results from this hospitalization that need follow up. 1. COPD. Pulmonary follow up for OP PFTs. Significant test results and incidental findings. CXR 5/5/2022:  No acute process.     Echo 5/5/2022:  Summary   Technically limited study due to patient body habitus.   Normal left ventricular cavity size. Moderate septal left ventricular hypertrophy. Hyperdynamic left ventricular systolic function with an estimated ejection fraction of 45-76%   Normal diastolic function for age.  [place] pulmonic insufficiency.   Grade I diastolic dysfunction with normal LV filling pressures. Invasive procedures and treatments. 1. None     Problem-based Hospital Course. Patient is an 79 yo female with hx CAD, HTN, HLD. She presented to ER with shortness of breath and BLE edema. No acute process on CXR, COVID and influenza screen negative. ProBNP elevated 1186 and started on IV Lasix. Noted to have ambulatory hypoxia in ER with O2 sat 87%. 1. Peripheral edema. EF 16-39%, grade I diastolic dysfunction. Received IV Lasix in ER and continued on admission, DCd for increasing BUN/creatinine. Doppler study negative for PE.   2. Shortness of breath / ambulatory hypoxia / cough.  O2 sat documented as 87% on room air at rest (5/6), required 3 liters with ambulation for O2 sat 92%. No hx chronic lung disease. COVID19 and influenza screen negative. CT pulmonary negative for PE. Evaluated by pulmonary, suspect exacerbation of previously undiagnosed COPD. Recommend Xopenex on DC and OP PFTs. Evaluated and qualified for home O2.      3. HTN. Controlled. Continue lisinopril, isosorbide, metoprolol. Continue to follow. 4. Hyponatremia. HCTZ held on admission, not resumed on DC. 5. CAD / chest pain. Hx multiple stents. Offers c/o substernal chest ache following HHN treatment,  says feels like typical [place]. \" EKG with no acute changes, troponin < 0.01. Relieved with NTG x 1, did not recur with ambulation in room. Echo 5/5 with normal wall motion. Continued isosorbide. Patient was evaluated today for the diagnosis of COPD. I entered a DME order for home oxygen because the diagnosis and testing requires the patient to have supplemental oxygen. Condition will improve or be benefited by oxygen use. The patient is  able to perform good mobility in a home setting and therefore does require the use of a portable oxygen system. The need for this equipment was discussed with the patient and she understands and is in agreement. Patient says she feels good, eager for DC home. Reviewed DC plans, follow up and medications. Expresses understanding. Questions answered. Consults. IP CONSULT TO HOSPITALIST  IP CONSULT TO SPIRITUAL SERVICES  IP CONSULT TO PULMONOLOGY    Physical examination on discharge day. /67   Pulse 79   Temp 97.9 °F (36.6 °C) (Oral)   Resp 18   Ht 4' 9\" (1.448 m)   Wt 155 lb (70.3 kg)   SpO2 96%   BMI 33.54 kg/m²   General appearance. Alert. Looks comfortable. HEENT. Sclera clear. Moist mucus membranes. Cardiovascular. Regular rate and rhythm, normal S1, S2. No murmur. Respiratory. Not using accessory muscles. Clear to auscultation bilaterally, no wheeze.   Gastrointestinal. Abdomen soft, non-tender, not distended, normal bowel sounds  Neurology. Facial symmetry. No speech deficits. Moving all extremities equally. Extremities. No edema in lower extremities. Skin. Warm, dry, normal turgor    Condition at time of discharge     Medication instructions provided to patient at discharge. Medication List      START taking these medications    benzonatate 100 MG capsule  Commonly known as: TESSALON  Take 1 capsule by mouth 3 times daily as needed for Cough     levalbuterol 45 MCG/ACT inhaler  Commonly known as: Xopenex HFA  Inhale 1 puff into the lungs every 4 hours as needed for Wheezing or Shortness of Breath        CONTINUE taking these medications    aspirin 81 MG EC tablet     Co Q-10 200 MG Caps     CRANBERRY EXTRACT PO     fish oil 1000 MG Caps     isosorbide mononitrate 30 MG extended release tablet  Commonly known as: IMDUR     lisinopril 10 MG tablet  Commonly known as: PRINIVIL;ZESTRIL     metoprolol tartrate 50 MG tablet  Commonly known as: LOPRESSOR     Vitamin D3 50 MCG (2000 UT) Caps     zinc 50 MG Tabs tablet        STOP taking these medications    hydroCHLOROthiazide 25 MG tablet  Commonly known as: HYDRODIURIL           Where to Get Your Medications      These medications were sent to [facility] 21 [id number] [address] Russell[address] Ariela [address] - P [phone] Nidia Norma [phone]  [address] [place] [address], [address] [place] [address]    Phone: [phone]   · benzonatate 100 MG capsule  · levalbuterol 45 MCG/ACT inhaler         Discharge recommendations given to patient. Follow Up. PCP in 1 week   Disposition. home  Activity. activity as tolerated  Diet: ADULT DIET; Regular      Spent > 30 minutes in discharge process.     Signed:  KYLE Rivera CNP     5/8/2022 4:24 PM

## 2022-05-08 NOTE — CARE COORDINATION
Fouzia Truong is discharging to the home of her daughter Snehal Zeng and needs home 02. She will stay at the [address] [place] [address] Road. [facility],  [facility]. The daughter Snehal Zeng [phone]  will provide a credit card for DME coverage. Order was called/faxed to   [address],  [place], [address]  Phone:  [phone]  Fax: [phone]     [facility]  [facility], [phone]    RN, Stef Loaiza aware that 02  was provided and patient signed the receipt.

## 2022-05-08 NOTE — PROGRESS NOTES
P Pulmonary and Critical Care   Progress Note      Reason for Consult: Hypoxemia  Requesting Physician: Dr. Muna Villafuerte:   [address] [facility]:                The patient is a 80 y.o. female with significant past medical history of:      Diagnosis Date    CAD (coronary artery disease)     Hyperlipidemia     Hypertension      Interval History: She felt a lot better after the HHN. However, this Am she had some chest pain following HHN. CT chest was clear, no PE. Melodie Lucio       Past Surgical History:        Procedure Laterality Date    CARDIAC SURGERY      CHOLECYSTECTOMY       Current Medications:    Current Facility-Administered Medications: nitroGLYCERIN (NITROSTAT) SL tablet 0.4 mg, 0.4 mg, SubLINGual, Q5 Min PRN  ipratropium-albuterol (DUONEB) nebulizer solution 1 ampule, 1 ampule, Inhalation, Q4H WA  guaiFENesin (MUCINEX) extended release tablet 600 mg, 600 mg, Oral, BID  benzonatate (TESSALON) capsule 100 mg, 100 mg, Oral, TID PRN  albuterol sulfate  (90 Base) MCG/ACT inhaler 2 puff, 2 puff, Inhalation, Q6H PRN  aspirin EC tablet 81 mg, 81 mg, Oral, Daily  isosorbide mononitrate (IMDUR) extended release tablet 30 mg, 30 mg, Oral, Daily  lisinopril (PRINIVIL;ZESTRIL) tablet 10 mg, 10 mg, Oral, Daily  sodium chloride flush 0.9 % injection 5-40 mL, 5-40 mL, IntraVENous, 2 times per day  sodium chloride flush 0.9 % injection 5-40 mL, 5-40 mL, IntraVENous, PRN  0.9 % sodium chloride infusion, , IntraVENous, PRN  enoxaparin (LOVENOX) injection 40 mg, 40 mg, SubCUTAneous, Nightly  ondansetron (ZOFRAN-ODT) disintegrating tablet 4 mg, 4 mg, Oral, Q8H PRN **OR** ondansetron (ZOFRAN) injection 4 mg, 4 mg, IntraVENous, Q6H PRN  polyethylene glycol (GLYCOLAX) packet 17 g, 17 g, Oral, Daily PRN  acetaminophen (TYLENOL) tablet 650 mg, 650 mg, Oral, Q6H PRN **OR** acetaminophen (TYLENOL) suppository 650 mg, 650 mg, Rectal, Q6H PRN  metoprolol succinate (TOPROL XL) extended release tablet 75 mg, 75 mg, Oral, Daily    No Known Allergies    Social History:    TOBACCO:   reports that she has quit smoking. She has never used smokeless tobacco.  ETOH:   reports no history of alcohol use. Patient currently lives independently  Environmental/chemical exposure: None known    Family History:   No family history on file. REVIEW OF SYSTEMS:    CONSTITUTIONAL:  negative for fevers, chills, diaphoresis, activity change, appetite change, fatigue, night sweats and unexpected weight change. EYES:  negative for blurred vision, eye discharge, visual disturbance and icterus  HEENT:  negative for hearing loss, tinnitus, ear drainage, sinus pressure, nasal congestion, epistaxis and snoring  RESPIRATORY:  See HPI  CARDIOVASCULAR:  negative for chest pain, palpitations, exertional chest pressure/discomfort, edema, syncope  GASTROINTESTINAL:  negative for nausea, vomiting, diarrhea, constipation, blood in stool and abdominal pain  GENITOURINARY:  negative for frequency, dysuria, urinary incontinence, decreased urine volume, and hematuria  HEMATOLOGIC/LYMPHATIC:  negative for easy bruising, bleeding and lymphadenopathy  ALLERGIC/IMMUNOLOGIC:  negative for recurrent infections, angioedema, anaphylaxis and drug reactions  ENDOCRINE:  negative for weight changes and diabetic symptoms including polyuria, polydipsia and polyphagia  MUSCULOSKELETAL:  negative for  pain, joint swelling, decreased range of motion and muscle weakness  NEUROLOGICAL:  negative for headaches, slurred speech, unilateral weakness  PSYCHIATRIC/BEHAVIORAL: negative for hallucinations, behavioral problems, confusion and agitation.      Objective:   PHYSICAL EXAM:      VITALS:  /67   Pulse 79   Temp 97.9 °F (36.6 °C) (Oral)   Resp 18   Ht 4' 9\" (1.448 m)   Wt 155 lb (70.3 kg)   SpO2 96%   BMI 33.54 kg/m²      24HR INTAKE/OUTPUT:      Intake/Output Summary (Last 24 hours) at 5/8/2022 1149  Last data filed at 5/7/2022 1325  Gross per 24 hour   Intake 120 ml Output --   Net 120 ml     CONSTITUTIONAL:  awake, alert, cooperative, no apparent distress, and appears stated age  NECK:  Supple, symmetrical, trachea midline, no adenopathy, thyroid symmetric, not enlarged and no tenderness, skin normal  LUNGS:  no increased work of breathing and clear to auscultation. No accessory muscle use  CARDIOVASCULAR: S1 and S2, no edema and no JVD  ABDOMEN:  normal bowel sounds, non-distended and no masses palpated, and no tenderness to palpation. No hepatospleenomegaly  LYMPHADENOPATHY:  no axillary or supraclavicular adenopathy. No cervical adnenopathy  PSYCHIATRIC: Oriented to person place and time. No obvious depression or anxiety. MUSCULOSKELETAL: No obvious misalignment or effusion of the joints. No clubbing, cyanosis of the digits. SKIN:  normal skin color, texture, turgor and no redness, warmth, or swelling. No palpable nodules    DATA:    Old records have been reviewed    CBC:  Recent Labs     05/06/22 0528 05/07/22 0502 05/08/22  0603   WBC 4.9 6.2 6.9   RBC 3.87* 3.82* 3.58*   HGB 12.7 13.0 11.8*   HCT 38.0 37.9 35.0*   * 124* 127*   MCV 98.4 99.2 97.7   MCH 32.9 34.0 33.1   MCHC 33.5 34.3 33.9   RDW 13.1 13.1 13.2      BMP:  Recent Labs     05/06/22 0528 05/07/22  0502 05/08/22  0603    138 135*   K 3.9 4.2 4.1   CL 99 99 100   CO2 26 25 23   BUN 24* 38* 42*   CREATININE 0.7 1.1 1.0   CALCIUM 9.4 9.2 9.0   GLUCOSE 117* 118* 116*      ABG:  No results for input(s): PHART, FRK8AVH, PO2ART, TJQ9NVU, P0CZVQAK, BEART in the last 72 hours. Procalcitonin  Recent Labs     05/07/22  0502   PROCAL 0.06       No results found for: BNP  Lab Results   Component Value Date    TROPONINI <0.01 05/05/2022           Radiology Review:  All pertinent images / reports were reviewed as a part of this visit. Assessment:     1. Acute hypoxemic respiratory failure  2.  COPD exacerbation      Plan:     I have reviewed laboratories, medical records and images for this visit  Initial chest x-ray is clear  CT chest is clear and shows no evidence of PE  Likely this is exacerbation of previously undiagnosed COPD  She does have a smoking history and quit about 4 years ago. Daughter admits to gradually worsening exertional dyspnea  Rest/Ex oximetry to see if she needs home O  Would DC with Xopenex  She would benefit from OPT PFT  May benefit from controller meds depending on how she does.   OK with me if she is discharged home when ok with the rest of the treatment team.

## 2022-05-08 NOTE — PROGRESS NOTES
05/08/22 1628   Resting (Room Air)   SpO2 87   HR 74   Resting (On O2)   SpO2 92   HR 65   O2 Device Nasal cannula   O2 Flow Rate (l/min) 1 l/min   During Walk (Room Air)   Walk/Assistance Device Walker   Rate of Dyspnea 1   During Walk (On O2)   SpO2 90   HR 75   O2 Device Nasal cannula   O2 Flow Rate (l/min) 2 l/min   Need Additional O2 Flow Rate Rows Yes   O2 Flow Rate (l/min) 3 l/min   O2 Saturation 94   Walk/Assistance Device Walker   Rate of Dyspnea 0   After Walk   SpO2 95   HR 79   O2 Device Nasal cannula   O2 Flow Rate (l/min) 2 l/min   Rate of Dyspnea 0   Does the Patient Qualify for Home O2 Yes   Liter Flow at Rest 1   Liter Flow on Exertion 3   Does the Patient Need Portable Oxygen Tanks Yes

## 2022-05-08 NOTE — PROGRESS NOTES
Patients head to toe assessment completed. Vital signs WNL. Bed alarm engaged, call light within reach. Scheduled medications given per MAR. Patient complain of chest discomfort from coughing, rates pain 5/10, mucinex given. Patient resting in bed. Will continue to monitor.

## 2022-05-08 NOTE — PLAN OF CARE
Okay per pulmonary for DC. Patient feels better and eager for DC. Plan is for DC to daughter's home until recovery and then will return to her home in [place]. Respiratory repeated home O2 evaluation and patient qualifies. Spoke with case management. Unfortunately, patient home address is outside [address] and portable O2 tank not available for outside provider at this hour. Spoke with nursing and notified patient not able to DC today due to issues with O2. Anticipate home tomorrow. Nursing will notify daughter and patient.

## 2022-05-09 LAB
EKG ATRIAL RATE: 100 BPM
EKG DIAGNOSIS: NORMAL
EKG P AXIS: 27 DEGREES
EKG P-R INTERVAL: 158 MS
EKG Q-T INTERVAL: 360 MS
EKG QRS DURATION: 90 MS
EKG QTC CALCULATION (BAZETT): 464 MS
EKG R AXIS: -6 DEGREES
EKG T AXIS: 10 DEGREES
EKG VENTRICULAR RATE: 100 BPM

## 2022-05-09 PROCEDURE — 93010 ELECTROCARDIOGRAM REPORT: CPT | Performed by: INTERNAL MEDICINE

## 2022-05-10 NOTE — DISCHARGE INSTR - COC
Continuity of Care Form    Patient Name: Blu Sandy   :  1940  MRN:  [id number]    Admit date:  2022  Discharge date:  ***    Code Status Order: Prior   Advance Directives:      Admitting Physician:  Jane Hills MD  PCP: Purvi Rod    Discharging Nurse: [facility] Unit/Room#: [id number]/7143-12  Discharging Unit Phone Number: ***    Emergency Contact:   Extended Emergency Contact Information  Primary Emergency Contact: Selene Oh  Mobile Phone: [phone]  Relation: Spouse  Secondary Emergency Contact: Ritu Valdez  Mobile Phone: [phone]  Relation: Child    Past Surgical History:  Past Surgical History:   Procedure Laterality Date    CARDIAC SURGERY      CHOLECYSTECTOMY         Immunization History:   Immunization History   Administered Date(s) Administered    COVID-19, Pfizer Purple top, DILUTE for use, 12+ yrs, 30mcg/0.3mL dose 2021, 2021, 2021       Active Problems:  Patient Active Problem List   Diagnosis Code    Peripheral edema R60.9       Isolation/Infection:   Isolation            No Isolation          Patient Infection Status       Infection Onset Added Last Indicated Last Indicated By Review Planned Expiration Resolved Resolved By    None active    Resolved    C-diff Rule Out 22 Clostridium difficile toxin/antigen (Ordered)   22 Rule-Out Test Resulted    COVID-19 (Rule Out) 22 COVID-19 & Influenza Combo (Ordered)   22 Rule-Out Test Resulted            Nurse Assessment:  Last Vital Signs: /67   Pulse 79   Temp 97.9 °F (36.6 °C) (Oral)   Resp 18   Ht 4' 9\" (1.448 m)   Wt 155 lb (70.3 kg)   SpO2 96%   BMI 33.54 kg/m²     Last documented pain score (0-10 scale): Pain Level: 5  Last Weight:   Wt Readings from Last 1 Encounters:   22 155 lb (70.3 kg)     Mental Status:  {IP PT MENTAL STATUS:}    IV Access:  [facility] PHU IV ACCESS:159016375}    Nursing Mobility/ADLs:  Walking   {CHP DME ZLVQ:604323696}  Transfer  {CHP DME VJAO:625491302}  Bathing  {CHP DME UQEO:589746599}  Dressing  {CHP DME YWEA:184422832}  Toileting  {CHP DME SCMY:513462173}  Feeding  {CHP DME YNLY:959723893}  Med Admin  {CHP DME WEMR:548775928}  Med Delivery   { PHU MED Delivery:296942611}    Wound Care Documentation and Therapy:        Elimination:  Continence: Bowel: {YES / MAZIN:32037}  Bladder: {YES / T}  Urinary Catheter: {Urinary Catheter:163230813}   Colostomy/Ileostomy/Ileal Conduit: {YES / CX:18879}       Date of Last BM: ***  No intake or output data in the 24 hours ending 05/10/22 1410  I/O last 3 completed shifts:   In: 80 [P.O.:580]  Out: 150 [Urine:150]    Safety Concerns:     [address] [employer] Safety Concerns:619394795}    Impairments/Disabilities:      [address] [employer] Impairments/Disabilities:246036983}    Nutrition Therapy:  Current Nutrition Therapy:   [address] [employer] Diet List:672746990}    Routes of Feeding: {[facility] DME Other Feedings:094939470}  Liquids: {Slp liquid thickness:98871}  Daily Fluid Restriction: {CHP DME Yes amt example:278880080}  Last Modified Barium Swallow with Video (Video Swallowing Test): {Done Not Done KFGP:085948537}    Treatments at the Time of Hospital Discharge:   Respiratory Treatments: ***  Oxygen Therapy:  {Therapy; copd oxygen:11991}  Ventilator:    { CC Vent ZRYM:816986389}    Rehab Therapies: {THERAPEUTIC INTERVENTION:5804119902}  Weight Bearing Status/Restrictions: [address] [employer] Weight Bearin}  Other Medical Equipment (for information only, NOT a DME order):  {EQUIPMENT:370834717}  Other Treatments: ***    Patient's personal belongings (please select all that are sent with patient):  {[facility] DME Belongings:470279776}    RN SIGNATURE:  {Esignature:338581184}    CASE MANAGEMENT/SOCIAL WORK SECTION    Inpatient Status Date: ***    Readmission Risk Assessment Score:  Readmission Risk              Risk of Unplanned Readmission:  0           Discharging to Facility/ Agency   Name:   Address:  Phone:  Fax:    Dialysis Facility (if applicable)   Name:  Address:  Dialysis Schedule:  Phone:  Fax:    / signature: {Esignature:349451081}    PHYSICIAN SECTION    Prognosis: {Prognosis:9875618058}    Condition at Discharge: Lianet Matthews Patient Condition:366375108}    Rehab Potential (if transferring to Rehab): {Prognosis:1347856633}    Recommended Labs or Other Treatments After Discharge: ***    Physician Certification: I certify the above information and transfer of Vega English  is necessary for the continuing treatment of the diagnosis listed and that she requires {Admit to Appropriate Level of Care:00883} for {GREATER/LESS:427133789} 30 days.      Update Admission H&P: {CHP DME Changes in XBALV:440686017}    PHYSICIAN SIGNATURE:  {Esignature:012520015}

## 2023-12-15 NOTE — PLAN OF CARE
Problem: Safety - Adult  Goal: Free from fall injury  5/8/2022 1115 by Shakir Neville RN  Outcome: Progressing  5/7/2022 2251 by Karol Angelucci, RN  Outcome: Progressing     Problem: Pain  Goal: Verbalizes/displays adequate comfort level or baseline comfort level  5/8/2022 1115 by Shakir Neville RN  Outcome: Progressing  5/7/2022 2251 by Karol Angelucci, RN  Outcome: Progressing Plan: Discuss hydroquinone pt decline. Detail Level: Zone Render In Strict Bullet Format?: No